# Patient Record
Sex: MALE | Race: WHITE | NOT HISPANIC OR LATINO | URBAN - NONMETROPOLITAN AREA
[De-identification: names, ages, dates, MRNs, and addresses within clinical notes are randomized per-mention and may not be internally consistent; named-entity substitution may affect disease eponyms.]

---

## 2023-09-20 LAB
GRAM STAIN: NORMAL
TISSUE/WOUND CULTURE/SMEAR: NORMAL

## 2024-04-08 ENCOUNTER — TELEMEDICINE (OUTPATIENT)
Dept: PRIMARY CARE | Facility: CLINIC | Age: 69
End: 2024-04-08
Payer: MEDICARE

## 2024-04-08 VITALS — TEMPERATURE: 98.4 F | OXYGEN SATURATION: 95 % | HEART RATE: 112 BPM

## 2024-04-08 DIAGNOSIS — R68.89 FLU-LIKE SYMPTOMS: Primary | ICD-10-CM

## 2024-04-08 DIAGNOSIS — J10.1 INFLUENZA A: ICD-10-CM

## 2024-04-08 LAB
FLUAV RNA RESP QL NAA+PROBE: DETECTED
FLUBV RNA RESP QL NAA+PROBE: NOT DETECTED

## 2024-04-08 PROCEDURE — 1159F MED LIST DOCD IN RCRD: CPT | Performed by: FAMILY MEDICINE

## 2024-04-08 PROCEDURE — 87502 INFLUENZA DNA AMP PROBE: CPT | Performed by: FAMILY MEDICINE

## 2024-04-08 PROCEDURE — 99441 PR PHYS/QHP TELEPHONE EVALUATION 5-10 MIN: CPT | Performed by: FAMILY MEDICINE

## 2024-04-08 ASSESSMENT — PATIENT HEALTH QUESTIONNAIRE - PHQ9
1. LITTLE INTEREST OR PLEASURE IN DOING THINGS: NOT AT ALL
SUM OF ALL RESPONSES TO PHQ9 QUESTIONS 1 AND 2: 0
2. FEELING DOWN, DEPRESSED OR HOPELESS: NOT AT ALL

## 2024-04-09 RX ORDER — OSELTAMIVIR PHOSPHATE 75 MG/1
75 CAPSULE ORAL 2 TIMES DAILY
Qty: 10 CAPSULE | Refills: 0 | Status: SHIPPED | OUTPATIENT
Start: 2024-04-09 | End: 2024-04-13 | Stop reason: HOSPADM

## 2024-04-11 ENCOUNTER — HOSPITAL ENCOUNTER (INPATIENT)
Facility: HOSPITAL | Age: 69
LOS: 1 days | Discharge: HOME | DRG: 195 | End: 2024-04-13
Attending: EMERGENCY MEDICINE | Admitting: INTERNAL MEDICINE
Payer: MEDICARE

## 2024-04-11 ENCOUNTER — APPOINTMENT (OUTPATIENT)
Dept: CARDIOLOGY | Facility: HOSPITAL | Age: 69
DRG: 195 | End: 2024-04-11
Payer: MEDICARE

## 2024-04-11 ENCOUNTER — APPOINTMENT (OUTPATIENT)
Dept: RADIOLOGY | Facility: HOSPITAL | Age: 69
DRG: 195 | End: 2024-04-11
Payer: MEDICARE

## 2024-04-11 DIAGNOSIS — E86.0 DEHYDRATION: ICD-10-CM

## 2024-04-11 DIAGNOSIS — R07.9 RIGHT-SIDED CHEST PAIN: ICD-10-CM

## 2024-04-11 DIAGNOSIS — J18.9 PNEUMONIA OF RIGHT LUNG DUE TO INFECTIOUS ORGANISM, UNSPECIFIED PART OF LUNG: Primary | ICD-10-CM

## 2024-04-11 LAB
ALBUMIN SERPL-MCNC: 3.7 G/DL (ref 3.5–5)
ALP BLD-CCNC: 92 U/L (ref 35–125)
ALT SERPL-CCNC: 31 U/L (ref 5–40)
ANION GAP SERPL CALC-SCNC: 13 MMOL/L
APPEARANCE UR: CLEAR
AST SERPL-CCNC: 23 U/L (ref 5–40)
BASOPHILS # BLD AUTO: 0.05 X10*3/UL (ref 0–0.1)
BASOPHILS NFR BLD AUTO: 0.3 %
BILIRUB SERPL-MCNC: 1 MG/DL (ref 0.1–1.2)
BILIRUB UR STRIP.AUTO-MCNC: NEGATIVE MG/DL
BUN SERPL-MCNC: 10 MG/DL (ref 8–25)
CALCIUM SERPL-MCNC: 9.4 MG/DL (ref 8.5–10.4)
CHLORIDE SERPL-SCNC: 97 MMOL/L (ref 97–107)
CO2 SERPL-SCNC: 22 MMOL/L (ref 24–31)
COLOR UR: YELLOW
CREAT SERPL-MCNC: 0.8 MG/DL (ref 0.4–1.6)
EGFRCR SERPLBLD CKD-EPI 2021: >90 ML/MIN/1.73M*2
EOSINOPHIL # BLD AUTO: 0.01 X10*3/UL (ref 0–0.7)
EOSINOPHIL NFR BLD AUTO: 0.1 %
ERYTHROCYTE [DISTWIDTH] IN BLOOD BY AUTOMATED COUNT: 13.1 % (ref 11.5–14.5)
FLUAV RNA RESP QL NAA+PROBE: NOT DETECTED
FLUBV RNA RESP QL NAA+PROBE: NOT DETECTED
GLUCOSE SERPL-MCNC: 129 MG/DL (ref 65–99)
GLUCOSE UR STRIP.AUTO-MCNC: NORMAL MG/DL
HCT VFR BLD AUTO: 41.7 % (ref 41–52)
HGB BLD-MCNC: 14.7 G/DL (ref 13.5–17.5)
IMM GRANULOCYTES # BLD AUTO: 0.16 X10*3/UL (ref 0–0.7)
IMM GRANULOCYTES NFR BLD AUTO: 0.8 % (ref 0–0.9)
KETONES UR STRIP.AUTO-MCNC: ABNORMAL MG/DL
LACTATE BLDV-SCNC: 1.1 MMOL/L (ref 0.4–2)
LEUKOCYTE ESTERASE UR QL STRIP.AUTO: NEGATIVE
LYMPHOCYTES # BLD AUTO: 1.39 X10*3/UL (ref 1.2–4.8)
LYMPHOCYTES NFR BLD AUTO: 7.1 %
MAGNESIUM SERPL-MCNC: 1.9 MG/DL (ref 1.6–3.1)
MCH RBC QN AUTO: 31.8 PG (ref 26–34)
MCHC RBC AUTO-ENTMCNC: 35.3 G/DL (ref 32–36)
MCV RBC AUTO: 90 FL (ref 80–100)
MONOCYTES # BLD AUTO: 1.94 X10*3/UL (ref 0.1–1)
MONOCYTES NFR BLD AUTO: 10 %
MUCOUS THREADS #/AREA URNS AUTO: NORMAL /LPF
NEUTROPHILS # BLD AUTO: 15.91 X10*3/UL (ref 1.2–7.7)
NEUTROPHILS NFR BLD AUTO: 81.7 %
NITRITE UR QL STRIP.AUTO: NEGATIVE
NRBC BLD-RTO: 0 /100 WBCS (ref 0–0)
PH UR STRIP.AUTO: 5.5 [PH]
PLATELET # BLD AUTO: 239 X10*3/UL (ref 150–450)
POTASSIUM SERPL-SCNC: 3.6 MMOL/L (ref 3.4–5.1)
PROT SERPL-MCNC: 7.9 G/DL (ref 5.9–7.9)
PROT UR STRIP.AUTO-MCNC: ABNORMAL MG/DL
RBC # BLD AUTO: 4.62 X10*6/UL (ref 4.5–5.9)
RBC # UR STRIP.AUTO: NEGATIVE /UL
RBC #/AREA URNS AUTO: NORMAL /HPF
SARS-COV-2 RNA RESP QL NAA+PROBE: NOT DETECTED
SODIUM SERPL-SCNC: 132 MMOL/L (ref 133–145)
SP GR UR STRIP.AUTO: 1.01
TROPONIN T SERPL-MCNC: 10 NG/L
TROPONIN T SERPL-MCNC: 9 NG/L
UROBILINOGEN UR STRIP.AUTO-MCNC: NORMAL MG/DL
WBC # BLD AUTO: 19.5 X10*3/UL (ref 4.4–11.3)
WBC #/AREA URNS AUTO: NORMAL /HPF

## 2024-04-11 PROCEDURE — 99285 EMERGENCY DEPT VISIT HI MDM: CPT | Mod: 25

## 2024-04-11 PROCEDURE — 96365 THER/PROPH/DIAG IV INF INIT: CPT

## 2024-04-11 PROCEDURE — 93010 ELECTROCARDIOGRAM REPORT: CPT | Performed by: INTERNAL MEDICINE

## 2024-04-11 PROCEDURE — 87636 SARSCOV2 & INF A&B AMP PRB: CPT | Performed by: PHYSICIAN ASSISTANT

## 2024-04-11 PROCEDURE — 36415 COLL VENOUS BLD VENIPUNCTURE: CPT | Performed by: PHYSICIAN ASSISTANT

## 2024-04-11 PROCEDURE — 2500000004 HC RX 250 GENERAL PHARMACY W/ HCPCS (ALT 636 FOR OP/ED): Performed by: PHYSICIAN ASSISTANT

## 2024-04-11 PROCEDURE — 85025 COMPLETE CBC W/AUTO DIFF WBC: CPT | Performed by: PHYSICIAN ASSISTANT

## 2024-04-11 PROCEDURE — 71275 CT ANGIOGRAPHY CHEST: CPT | Performed by: STUDENT IN AN ORGANIZED HEALTH CARE EDUCATION/TRAINING PROGRAM

## 2024-04-11 PROCEDURE — 71275 CT ANGIOGRAPHY CHEST: CPT

## 2024-04-11 PROCEDURE — 81001 URINALYSIS AUTO W/SCOPE: CPT | Performed by: PHYSICIAN ASSISTANT

## 2024-04-11 PROCEDURE — 87040 BLOOD CULTURE FOR BACTERIA: CPT | Mod: TRILAB | Performed by: EMERGENCY MEDICINE

## 2024-04-11 PROCEDURE — 84484 ASSAY OF TROPONIN QUANT: CPT | Performed by: PHYSICIAN ASSISTANT

## 2024-04-11 PROCEDURE — 71045 X-RAY EXAM CHEST 1 VIEW: CPT

## 2024-04-11 PROCEDURE — 2500000004 HC RX 250 GENERAL PHARMACY W/ HCPCS (ALT 636 FOR OP/ED): Performed by: EMERGENCY MEDICINE

## 2024-04-11 PROCEDURE — 96361 HYDRATE IV INFUSION ADD-ON: CPT

## 2024-04-11 PROCEDURE — 96375 TX/PRO/DX INJ NEW DRUG ADDON: CPT

## 2024-04-11 PROCEDURE — 93005 ELECTROCARDIOGRAM TRACING: CPT

## 2024-04-11 PROCEDURE — 83735 ASSAY OF MAGNESIUM: CPT | Performed by: PHYSICIAN ASSISTANT

## 2024-04-11 PROCEDURE — 71045 X-RAY EXAM CHEST 1 VIEW: CPT | Performed by: RADIOLOGY

## 2024-04-11 PROCEDURE — 80053 COMPREHEN METABOLIC PANEL: CPT | Performed by: PHYSICIAN ASSISTANT

## 2024-04-11 PROCEDURE — 2550000001 HC RX 255 CONTRASTS: Performed by: EMERGENCY MEDICINE

## 2024-04-11 PROCEDURE — 83605 ASSAY OF LACTIC ACID: CPT | Performed by: EMERGENCY MEDICINE

## 2024-04-11 RX ORDER — ACETAMINOPHEN 325 MG/1
975 TABLET ORAL ONCE
Status: COMPLETED | OUTPATIENT
Start: 2024-04-11 | End: 2024-04-11

## 2024-04-11 RX ORDER — CEFTRIAXONE 2 G/50ML
2 INJECTION, SOLUTION INTRAVENOUS ONCE
Status: COMPLETED | OUTPATIENT
Start: 2024-04-11 | End: 2024-04-11

## 2024-04-11 RX ORDER — ONDANSETRON HYDROCHLORIDE 2 MG/ML
4 INJECTION, SOLUTION INTRAVENOUS ONCE
Status: COMPLETED | OUTPATIENT
Start: 2024-04-11 | End: 2024-04-11

## 2024-04-11 RX ORDER — MORPHINE SULFATE 4 MG/ML
4 INJECTION, SOLUTION INTRAMUSCULAR; INTRAVENOUS ONCE
Status: COMPLETED | OUTPATIENT
Start: 2024-04-11 | End: 2024-04-11

## 2024-04-11 RX ADMIN — CEFTRIAXONE SODIUM 2 G: 2 INJECTION, SOLUTION INTRAVENOUS at 23:11

## 2024-04-11 RX ADMIN — MORPHINE SULFATE 4 MG: 4 INJECTION, SOLUTION INTRAMUSCULAR; INTRAVENOUS at 23:33

## 2024-04-11 RX ADMIN — IOHEXOL 75 ML: 350 INJECTION, SOLUTION INTRAVENOUS at 22:39

## 2024-04-11 RX ADMIN — SODIUM CHLORIDE 1000 ML: 900 INJECTION, SOLUTION INTRAVENOUS at 22:37

## 2024-04-11 RX ADMIN — ACETAMINOPHEN 975 MG: 325 TABLET ORAL at 22:35

## 2024-04-11 RX ADMIN — ONDANSETRON HYDROCHLORIDE 4 MG: 2 INJECTION INTRAMUSCULAR; INTRAVENOUS at 22:25

## 2024-04-11 RX ADMIN — SODIUM CHLORIDE 1000 ML: 900 INJECTION, SOLUTION INTRAVENOUS at 19:56

## 2024-04-11 ASSESSMENT — PAIN DESCRIPTION - FREQUENCY: FREQUENCY: CONSTANT/CONTINUOUS

## 2024-04-11 ASSESSMENT — PAIN - FUNCTIONAL ASSESSMENT
PAIN_FUNCTIONAL_ASSESSMENT: 0-10
PAIN_FUNCTIONAL_ASSESSMENT: 0-10

## 2024-04-11 ASSESSMENT — PAIN SCALES - GENERAL
PAINLEVEL_OUTOF10: 10 - WORST POSSIBLE PAIN

## 2024-04-11 ASSESSMENT — PAIN DESCRIPTION - LOCATION
LOCATION: CHEST
LOCATION: ABDOMEN

## 2024-04-11 ASSESSMENT — PAIN DESCRIPTION - PAIN TYPE: TYPE: ACUTE PAIN

## 2024-04-11 ASSESSMENT — COLUMBIA-SUICIDE SEVERITY RATING SCALE - C-SSRS
2. HAVE YOU ACTUALLY HAD ANY THOUGHTS OF KILLING YOURSELF?: NO
1. IN THE PAST MONTH, HAVE YOU WISHED YOU WERE DEAD OR WISHED YOU COULD GO TO SLEEP AND NOT WAKE UP?: NO
6. HAVE YOU EVER DONE ANYTHING, STARTED TO DO ANYTHING, OR PREPARED TO DO ANYTHING TO END YOUR LIFE?: NO

## 2024-04-11 ASSESSMENT — PAIN DESCRIPTION - PROGRESSION: CLINICAL_PROGRESSION: NOT CHANGED

## 2024-04-11 ASSESSMENT — PAIN DESCRIPTION - DESCRIPTORS: DESCRIPTORS: PRESSURE

## 2024-04-11 ASSESSMENT — PAIN DESCRIPTION - ONSET: ONSET: AWAKENED FROM SLEEP

## 2024-04-11 NOTE — ED TRIAGE NOTES
Triage Note:  Date of Encounter: [unfilled]   MRN: [id number]    I saw the patient as the clinician in triage and performed a brief history and physical exam established acuity and order appropriate test to develop basic plan of care.  Patient will be seen by LUANN and/or the physician who will independently evaluate  The patient.  Please see subsequent provider notes for further details and disposition      Brief HPI:   In brief, 68-year-old male here for syncopal event, recently diagnosed with influenza, not feeling well    Focused physical exam:  Speaking clearly no signs of labored breathing respiratory distress    Plan/MDM:  Laboratory studies, EKG, IV fluids        Please see subsequent provider note for details and disposition

## 2024-04-12 ENCOUNTER — APPOINTMENT (OUTPATIENT)
Dept: CARDIOLOGY | Facility: HOSPITAL | Age: 69
DRG: 195 | End: 2024-04-12
Payer: MEDICARE

## 2024-04-12 PROBLEM — J18.9 PNEUMONIA OF RIGHT LUNG DUE TO INFECTIOUS ORGANISM, UNSPECIFIED PART OF LUNG: Status: ACTIVE | Noted: 2024-04-12

## 2024-04-12 LAB
ALBUMIN SERPL-MCNC: 2.6 G/DL (ref 3.5–5)
ALP BLD-CCNC: 85 U/L (ref 35–125)
ALT SERPL-CCNC: 22 U/L (ref 5–40)
AMPHETAMINES UR QL SCN>1000 NG/ML: NEGATIVE
ANION GAP SERPL CALC-SCNC: 10 MMOL/L
AST SERPL-CCNC: 17 U/L (ref 5–40)
BARBITURATES UR QL SCN>300 NG/ML: NEGATIVE
BENZODIAZ UR QL SCN>300 NG/ML: NEGATIVE
BILIRUB SERPL-MCNC: 0.6 MG/DL (ref 0.1–1.2)
BUN SERPL-MCNC: 9 MG/DL (ref 8–25)
BZE UR QL SCN>300 NG/ML: NEGATIVE
C DIF TOX TCDA+TCDB STL QL NAA+PROBE: NOT DETECTED
CALCIUM SERPL-MCNC: 8.1 MG/DL (ref 8.5–10.4)
CANNABINOIDS UR QL SCN>50 NG/ML: POSITIVE
CHLORIDE SERPL-SCNC: 102 MMOL/L (ref 97–107)
CO2 SERPL-SCNC: 23 MMOL/L (ref 24–31)
CREAT SERPL-MCNC: 0.8 MG/DL (ref 0.4–1.6)
EGFRCR SERPLBLD CKD-EPI 2021: >90 ML/MIN/1.73M*2
ERYTHROCYTE [DISTWIDTH] IN BLOOD BY AUTOMATED COUNT: 13.2 % (ref 11.5–14.5)
ETHANOL SERPL-MCNC: <0.01 G/DL
FENTANYL+NORFENTANYL UR QL SCN: NEGATIVE
GLUCOSE SERPL-MCNC: 97 MG/DL (ref 65–99)
HCT VFR BLD AUTO: 35.7 % (ref 41–52)
HGB BLD-MCNC: 12.3 G/DL (ref 13.5–17.5)
INR PPP: 1.3 (ref 0.9–1.2)
LEGIONELLA AG UR QL: NEGATIVE
MAGNESIUM SERPL-MCNC: 1.9 MG/DL (ref 1.6–3.1)
MAGNESIUM SERPL-MCNC: 2 MG/DL (ref 1.6–3.1)
MCH RBC QN AUTO: 32 PG (ref 26–34)
MCHC RBC AUTO-ENTMCNC: 34.5 G/DL (ref 32–36)
MCV RBC AUTO: 93 FL (ref 80–100)
METHADONE UR QL SCN>300 NG/ML: NEGATIVE
NRBC BLD-RTO: 0 /100 WBCS (ref 0–0)
NT-PROBNP SERPL-MCNC: 236 PG/ML (ref 0–229)
OPIATES UR QL SCN>300 NG/ML: POSITIVE
OXYCODONE UR QL: NEGATIVE
PCP UR QL SCN>25 NG/ML: NEGATIVE
PLATELET # BLD AUTO: 204 X10*3/UL (ref 150–450)
POTASSIUM SERPL-SCNC: 3.4 MMOL/L (ref 3.4–5.1)
PROT SERPL-MCNC: 6 G/DL (ref 5.9–7.9)
PROTHROMBIN TIME: 13.4 SECONDS (ref 9.3–12.7)
RBC # BLD AUTO: 3.84 X10*6/UL (ref 4.5–5.9)
S PNEUM AG UR QL: NEGATIVE
SODIUM SERPL-SCNC: 135 MMOL/L (ref 133–145)
TROPONIN T SERPL-MCNC: 11 NG/L
TROPONIN T SERPL-MCNC: 11 NG/L
TROPONIN T SERPL-MCNC: 12 NG/L
VANCOMYCIN SERPL-MCNC: 6.8 UG/ML (ref 10–20)
WBC # BLD AUTO: 12.1 X10*3/UL (ref 4.4–11.3)

## 2024-04-12 PROCEDURE — 85027 COMPLETE CBC AUTOMATED: CPT | Performed by: INTERNAL MEDICINE

## 2024-04-12 PROCEDURE — 2500000004 HC RX 250 GENERAL PHARMACY W/ HCPCS (ALT 636 FOR OP/ED): Mod: JZ

## 2024-04-12 PROCEDURE — 2500000004 HC RX 250 GENERAL PHARMACY W/ HCPCS (ALT 636 FOR OP/ED): Performed by: EMERGENCY MEDICINE

## 2024-04-12 PROCEDURE — 2500000004 HC RX 250 GENERAL PHARMACY W/ HCPCS (ALT 636 FOR OP/ED): Performed by: INTERNAL MEDICINE

## 2024-04-12 PROCEDURE — 80053 COMPREHEN METABOLIC PANEL: CPT | Performed by: INTERNAL MEDICINE

## 2024-04-12 PROCEDURE — 87449 NOS EACH ORGANISM AG IA: CPT | Mod: TRILAB,WESLAB | Performed by: INTERNAL MEDICINE

## 2024-04-12 PROCEDURE — 83735 ASSAY OF MAGNESIUM: CPT | Performed by: INTERNAL MEDICINE

## 2024-04-12 PROCEDURE — 84484 ASSAY OF TROPONIN QUANT: CPT | Performed by: INTERNAL MEDICINE

## 2024-04-12 PROCEDURE — 2500000001 HC RX 250 WO HCPCS SELF ADMINISTERED DRUGS (ALT 637 FOR MEDICARE OP): Performed by: NURSE PRACTITIONER

## 2024-04-12 PROCEDURE — 85610 PROTHROMBIN TIME: CPT | Performed by: INTERNAL MEDICINE

## 2024-04-12 PROCEDURE — 94664 DEMO&/EVAL PT USE INHALER: CPT

## 2024-04-12 PROCEDURE — G0378 HOSPITAL OBSERVATION PER HR: HCPCS

## 2024-04-12 PROCEDURE — 36415 COLL VENOUS BLD VENIPUNCTURE: CPT | Performed by: INTERNAL MEDICINE

## 2024-04-12 PROCEDURE — 2500000002 HC RX 250 W HCPCS SELF ADMINISTERED DRUGS (ALT 637 FOR MEDICARE OP, ALT 636 FOR OP/ED): Performed by: INTERNAL MEDICINE

## 2024-04-12 PROCEDURE — 82077 ASSAY SPEC XCP UR&BREATH IA: CPT | Performed by: INTERNAL MEDICINE

## 2024-04-12 PROCEDURE — 93005 ELECTROCARDIOGRAM TRACING: CPT

## 2024-04-12 PROCEDURE — 99221 1ST HOSP IP/OBS SF/LOW 40: CPT | Performed by: INTERNAL MEDICINE

## 2024-04-12 PROCEDURE — 80202 ASSAY OF VANCOMYCIN: CPT

## 2024-04-12 PROCEDURE — 80349 CANNABINOIDS NATURAL: CPT | Performed by: INTERNAL MEDICINE

## 2024-04-12 PROCEDURE — 87899 AGENT NOS ASSAY W/OPTIC: CPT | Mod: TRILAB,WESLAB | Performed by: INTERNAL MEDICINE

## 2024-04-12 PROCEDURE — 2500000001 HC RX 250 WO HCPCS SELF ADMINISTERED DRUGS (ALT 637 FOR MEDICARE OP): Performed by: INTERNAL MEDICINE

## 2024-04-12 PROCEDURE — 87493 C DIFF AMPLIFIED PROBE: CPT | Performed by: INTERNAL MEDICINE

## 2024-04-12 PROCEDURE — 80365 DRUG SCREENING OXYCODONE: CPT | Mod: TRILAB,WESLAB | Performed by: INTERNAL MEDICINE

## 2024-04-12 PROCEDURE — 83880 ASSAY OF NATRIURETIC PEPTIDE: CPT | Performed by: INTERNAL MEDICINE

## 2024-04-12 PROCEDURE — 87506 IADNA-DNA/RNA PROBE TQ 6-11: CPT | Mod: TRILAB,WESLAB | Performed by: INTERNAL MEDICINE

## 2024-04-12 PROCEDURE — 80307 DRUG TEST PRSMV CHEM ANLYZR: CPT | Performed by: INTERNAL MEDICINE

## 2024-04-12 PROCEDURE — 1200000002 HC GENERAL ROOM WITH TELEMETRY DAILY

## 2024-04-12 RX ORDER — ENOXAPARIN SODIUM 100 MG/ML
40 INJECTION SUBCUTANEOUS NIGHTLY
Status: DISCONTINUED | OUTPATIENT
Start: 2024-04-12 | End: 2024-04-13 | Stop reason: HOSPADM

## 2024-04-12 RX ORDER — ACETAMINOPHEN 325 MG/1
650 TABLET ORAL EVERY 4 HOURS PRN
Status: DISCONTINUED | OUTPATIENT
Start: 2024-04-12 | End: 2024-04-13 | Stop reason: HOSPADM

## 2024-04-12 RX ORDER — ACETAMINOPHEN 160 MG/5ML
650 SOLUTION ORAL EVERY 4 HOURS PRN
Status: DISCONTINUED | OUTPATIENT
Start: 2024-04-12 | End: 2024-04-13 | Stop reason: HOSPADM

## 2024-04-12 RX ORDER — PANTOPRAZOLE SODIUM 40 MG/10ML
40 INJECTION, POWDER, LYOPHILIZED, FOR SOLUTION INTRAVENOUS
Status: DISCONTINUED | OUTPATIENT
Start: 2024-04-12 | End: 2024-04-13 | Stop reason: HOSPADM

## 2024-04-12 RX ORDER — PANTOPRAZOLE SODIUM 40 MG/1
40 TABLET, DELAYED RELEASE ORAL
Status: DISCONTINUED | OUTPATIENT
Start: 2024-04-12 | End: 2024-04-13 | Stop reason: HOSPADM

## 2024-04-12 RX ORDER — FOLIC ACID 1 MG/1
1 TABLET ORAL DAILY
Status: DISCONTINUED | OUTPATIENT
Start: 2024-04-12 | End: 2024-04-13 | Stop reason: HOSPADM

## 2024-04-12 RX ORDER — OSELTAMIVIR PHOSPHATE 75 MG/1
75 CAPSULE ORAL NIGHTLY
Qty: 2 CAPSULE | Refills: 0 | Status: DISCONTINUED | OUTPATIENT
Start: 2024-04-12 | End: 2024-04-12

## 2024-04-12 RX ORDER — MULTIVIT-MIN/IRON FUM/FOLIC AC 7.5 MG-4
1 TABLET ORAL DAILY
Status: DISCONTINUED | OUTPATIENT
Start: 2024-04-12 | End: 2024-04-13 | Stop reason: HOSPADM

## 2024-04-12 RX ORDER — VANCOMYCIN HYDROCHLORIDE 1 G/20ML
INJECTION, POWDER, LYOPHILIZED, FOR SOLUTION INTRAVENOUS DAILY PRN
Status: DISCONTINUED | OUTPATIENT
Start: 2024-04-12 | End: 2024-04-12

## 2024-04-12 RX ORDER — POLYETHYLENE GLYCOL 3350 17 G/17G
17 POWDER, FOR SOLUTION ORAL DAILY
Status: DISCONTINUED | OUTPATIENT
Start: 2024-04-12 | End: 2024-04-13 | Stop reason: HOSPADM

## 2024-04-12 RX ORDER — IPRATROPIUM BROMIDE AND ALBUTEROL SULFATE 2.5; .5 MG/3ML; MG/3ML
3 SOLUTION RESPIRATORY (INHALATION) EVERY 2 HOUR PRN
Status: DISCONTINUED | OUTPATIENT
Start: 2024-04-12 | End: 2024-04-13 | Stop reason: HOSPADM

## 2024-04-12 RX ORDER — DIPHENHYDRAMINE HCL 25 MG
25 TABLET ORAL EVERY 6 HOURS PRN
Status: DISCONTINUED | OUTPATIENT
Start: 2024-04-12 | End: 2024-04-13 | Stop reason: HOSPADM

## 2024-04-12 RX ORDER — VANCOMYCIN 1 G/200ML
1000 INJECTION, SOLUTION INTRAVENOUS EVERY 12 HOURS
Status: DISCONTINUED | OUTPATIENT
Start: 2024-04-12 | End: 2024-04-12

## 2024-04-12 RX ORDER — ACETAMINOPHEN 650 MG/1
650 SUPPOSITORY RECTAL EVERY 4 HOURS PRN
Status: DISCONTINUED | OUTPATIENT
Start: 2024-04-12 | End: 2024-04-13 | Stop reason: HOSPADM

## 2024-04-12 RX ORDER — SODIUM CHLORIDE 9 MG/ML
100 INJECTION, SOLUTION INTRAVENOUS CONTINUOUS
Status: DISCONTINUED | OUTPATIENT
Start: 2024-04-12 | End: 2024-04-13 | Stop reason: HOSPADM

## 2024-04-12 RX ORDER — OXYCODONE AND ACETAMINOPHEN 5; 325 MG/1; MG/1
1 TABLET ORAL EVERY 6 HOURS PRN
Status: DISCONTINUED | OUTPATIENT
Start: 2024-04-12 | End: 2024-04-12

## 2024-04-12 RX ORDER — LANOLIN ALCOHOL/MO/W.PET/CERES
100 CREAM (GRAM) TOPICAL DAILY
Status: DISCONTINUED | OUTPATIENT
Start: 2024-04-15 | End: 2024-04-13 | Stop reason: HOSPADM

## 2024-04-12 RX ORDER — MORPHINE SULFATE 2 MG/ML
2 INJECTION, SOLUTION INTRAMUSCULAR; INTRAVENOUS EVERY 4 HOURS PRN
Status: DISCONTINUED | OUTPATIENT
Start: 2024-04-12 | End: 2024-04-12

## 2024-04-12 RX ADMIN — OSELTAMIVIR PHOSPHATE 75 MG: 75 CAPSULE ORAL at 02:01

## 2024-04-12 RX ADMIN — PANTOPRAZOLE SODIUM 40 MG: 40 TABLET, DELAYED RELEASE ORAL at 07:10

## 2024-04-12 RX ADMIN — ACETAMINOPHEN 650 MG: 325 TABLET ORAL at 23:38

## 2024-04-12 RX ADMIN — Medication 1 TABLET: at 08:19

## 2024-04-12 RX ADMIN — THIAMINE HYDROCHLORIDE 500 MG: 100 INJECTION, SOLUTION INTRAMUSCULAR; INTRAVENOUS at 15:46

## 2024-04-12 RX ADMIN — PIPERACILLIN SODIUM AND TAZOBACTAM SODIUM 4.5 G: 4; .5 INJECTION, SOLUTION INTRAVENOUS at 02:56

## 2024-04-12 RX ADMIN — FOLIC ACID 1 MG: 1 TABLET ORAL at 08:19

## 2024-04-12 RX ADMIN — ENOXAPARIN SODIUM 40 MG: 40 INJECTION SUBCUTANEOUS at 20:16

## 2024-04-12 RX ADMIN — MORPHINE SULFATE 2 MG: 2 INJECTION, SOLUTION INTRAMUSCULAR; INTRAVENOUS at 04:08

## 2024-04-12 RX ADMIN — MORPHINE SULFATE 2 MG: 2 INJECTION, SOLUTION INTRAMUSCULAR; INTRAVENOUS at 07:58

## 2024-04-12 RX ADMIN — SODIUM CHLORIDE 100 ML/HR: 900 INJECTION, SOLUTION INTRAVENOUS at 20:01

## 2024-04-12 RX ADMIN — ENOXAPARIN SODIUM 40 MG: 40 INJECTION SUBCUTANEOUS at 02:11

## 2024-04-12 RX ADMIN — SODIUM CHLORIDE 100 ML/HR: 900 INJECTION, SOLUTION INTRAVENOUS at 01:20

## 2024-04-12 RX ADMIN — VANCOMYCIN 1000 MG: 1 INJECTION, SOLUTION INTRAVENOUS at 04:04

## 2024-04-12 RX ADMIN — ACETAMINOPHEN 650 MG: 325 TABLET ORAL at 02:00

## 2024-04-12 RX ADMIN — THIAMINE HYDROCHLORIDE 500 MG: 100 INJECTION, SOLUTION INTRAMUSCULAR; INTRAVENOUS at 23:22

## 2024-04-12 RX ADMIN — PIPERACILLIN SODIUM AND TAZOBACTAM SODIUM 4.5 G: 4; .5 INJECTION, SOLUTION INTRAVENOUS at 14:23

## 2024-04-12 RX ADMIN — ACETAMINOPHEN 650 MG: 325 TABLET ORAL at 20:15

## 2024-04-12 RX ADMIN — PIPERACILLIN SODIUM AND TAZOBACTAM SODIUM 4.5 G: 4; .5 INJECTION, SOLUTION INTRAVENOUS at 07:08

## 2024-04-12 RX ADMIN — AZITHROMYCIN MONOHYDRATE 500 MG: 500 INJECTION, POWDER, LYOPHILIZED, FOR SOLUTION INTRAVENOUS at 00:25

## 2024-04-12 RX ADMIN — THIAMINE HYDROCHLORIDE 500 MG: 100 INJECTION, SOLUTION INTRAMUSCULAR; INTRAVENOUS at 02:01

## 2024-04-12 RX ADMIN — DIPHENHYDRAMINE HYDROCHLORIDE 25 MG: 25 TABLET ORAL at 11:03

## 2024-04-12 RX ADMIN — PIPERACILLIN SODIUM AND TAZOBACTAM SODIUM 4.5 G: 4; .5 INJECTION, SOLUTION INTRAVENOUS at 20:01

## 2024-04-12 RX ADMIN — THIAMINE HYDROCHLORIDE 500 MG: 100 INJECTION, SOLUTION INTRAMUSCULAR; INTRAVENOUS at 08:19

## 2024-04-12 SDOH — SOCIAL STABILITY: SOCIAL INSECURITY: HAS ANYONE EVER THREATENED TO HURT YOUR FAMILY OR YOUR PETS?: NO

## 2024-04-12 SDOH — SOCIAL STABILITY: SOCIAL INSECURITY: ARE YOU OR HAVE YOU BEEN THREATENED OR ABUSED PHYSICALLY, EMOTIONALLY, OR SEXUALLY BY ANYONE?: NO

## 2024-04-12 SDOH — SOCIAL STABILITY: SOCIAL INSECURITY: DO YOU FEEL UNSAFE GOING BACK TO THE PLACE WHERE YOU ARE LIVING?: NO

## 2024-04-12 SDOH — SOCIAL STABILITY: SOCIAL INSECURITY: HAVE YOU HAD THOUGHTS OF HARMING ANYONE ELSE?: NO

## 2024-04-12 SDOH — SOCIAL STABILITY: SOCIAL INSECURITY: ABUSE: ADULT

## 2024-04-12 SDOH — SOCIAL STABILITY: SOCIAL INSECURITY: DO YOU FEEL ANYONE HAS EXPLOITED OR TAKEN ADVANTAGE OF YOU FINANCIALLY OR OF YOUR PERSONAL PROPERTY?: NO

## 2024-04-12 SDOH — SOCIAL STABILITY: SOCIAL INSECURITY: WERE YOU ABLE TO COMPLETE ALL THE BEHAVIORAL HEALTH SCREENINGS?: YES

## 2024-04-12 SDOH — SOCIAL STABILITY: SOCIAL INSECURITY: ARE THERE ANY APPARENT SIGNS OF INJURIES/BEHAVIORS THAT COULD BE RELATED TO ABUSE/NEGLECT?: NO

## 2024-04-12 SDOH — SOCIAL STABILITY: SOCIAL INSECURITY: DOES ANYONE TRY TO KEEP YOU FROM HAVING/CONTACTING OTHER FRIENDS OR DOING THINGS OUTSIDE YOUR HOME?: NO

## 2024-04-12 ASSESSMENT — COGNITIVE AND FUNCTIONAL STATUS - GENERAL
MOBILITY SCORE: 24
DAILY ACTIVITIY SCORE: 24
PATIENT BASELINE BEDBOUND: NO
DAILY ACTIVITIY SCORE: 24
DAILY ACTIVITIY SCORE: 24

## 2024-04-12 ASSESSMENT — ACTIVITIES OF DAILY LIVING (ADL)
WALKS IN HOME: INDEPENDENT
ADEQUATE_TO_COMPLETE_ADL: YES
HEARING - LEFT EAR: FUNCTIONAL
BATHING: INDEPENDENT
DRESSING YOURSELF: INDEPENDENT
PATIENT'S MEMORY ADEQUATE TO SAFELY COMPLETE DAILY ACTIVITIES?: YES
TOILETING: INDEPENDENT
JUDGMENT_ADEQUATE_SAFELY_COMPLETE_DAILY_ACTIVITIES: YES
GROOMING: INDEPENDENT
FEEDING YOURSELF: INDEPENDENT
LACK_OF_TRANSPORTATION: NO
HEARING - RIGHT EAR: FUNCTIONAL

## 2024-04-12 ASSESSMENT — PAIN SCALES - GENERAL
PAINLEVEL_OUTOF10: 9
PAINLEVEL_OUTOF10: 0 - NO PAIN
PAINLEVEL_OUTOF10: 6
PAINLEVEL_OUTOF10: 4
PAINLEVEL_OUTOF10: 6
PAINLEVEL_OUTOF10: 10 - WORST POSSIBLE PAIN
PAINLEVEL_OUTOF10: 7
PAINLEVEL_OUTOF10: 4
PAINLEVEL_OUTOF10: 10 - WORST POSSIBLE PAIN
PAINLEVEL_OUTOF10: 4
PAINLEVEL_OUTOF10: 10 - WORST POSSIBLE PAIN
PAINLEVEL_OUTOF10: 2
PAINLEVEL_OUTOF10: 0 - NO PAIN

## 2024-04-12 ASSESSMENT — LIFESTYLE VARIABLES
AUDIT-C TOTAL SCORE: 4
HOW OFTEN DURING THE LAST YEAR HAVE YOU NEEDED AN ALCOHOLIC DRINK FIRST THING IN THE MORNING TO GET YOURSELF GOING AFTER A NIGHT OF HEAVY DRINKING: NEVER
HOW OFTEN DURING THE LAST YEAR HAVE YOU FOUND THAT YOU WERE NOT ABLE TO STOP DRINKING ONCE YOU HAD STARTED: NEVER
HOW OFTEN DURING THE LAST YEAR HAVE YOU HAD A FEELING OF GUILT OR REMORSE AFTER DRINKING: NEVER
HOW OFTEN DURING THE LAST YEAR HAVE YOU BEEN UNABLE TO REMEMBER WHAT HAPPENED THE NIGHT BEFORE BECAUSE YOU HAD BEEN DRINKING: NEVER
AUDIT TOTAL SCORE: 0
AUDIT-C TOTAL SCORE: 4
SKIP TO QUESTIONS 9-10: 1
AUDIT TOTAL SCORE: 4
HOW OFTEN DO YOU HAVE A DRINK CONTAINING ALCOHOL: 4 OR MORE TIMES A WEEK
HOW MANY STANDARD DRINKS CONTAINING ALCOHOL DO YOU HAVE ON A TYPICAL DAY: 1 OR 2
HOW OFTEN DO YOU HAVE 6 OR MORE DRINKS ON ONE OCCASION: NEVER
HAS A RELATIVE, FRIEND, DOCTOR, OR ANOTHER HEALTH PROFESSIONAL EXPRESSED CONCERN ABOUT YOUR DRINKING OR SUGGESTED YOU CUT DOWN: NO
HAVE YOU OR SOMEONE ELSE BEEN INJURED AS A RESULT OF YOUR DRINKING: NO
HOW OFTEN DURING THE LAST YEAR HAVE YOU FAILED TO DO WHAT WAS NORMALLY EXPECTED FROM YOU BECAUSE OF DRINKING: NEVER

## 2024-04-12 ASSESSMENT — ENCOUNTER SYMPTOMS
GASTROINTESTINAL NEGATIVE: 1
EYES NEGATIVE: 1
DIARRHEA: 1
ENDOCRINE NEGATIVE: 1
NEUROLOGICAL NEGATIVE: 1
CARDIOVASCULAR NEGATIVE: 1
ALLERGIC/IMMUNOLOGIC NEGATIVE: 1
HEMATOLOGIC/LYMPHATIC NEGATIVE: 1
CONSTITUTIONAL NEGATIVE: 1
PSYCHIATRIC NEGATIVE: 1
CHEST TIGHTNESS: 1
COUGH: 1
MUSCULOSKELETAL NEGATIVE: 1
CHILLS: 1

## 2024-04-12 ASSESSMENT — PAIN - FUNCTIONAL ASSESSMENT
PAIN_FUNCTIONAL_ASSESSMENT: 0-10
PAIN_FUNCTIONAL_ASSESSMENT: UNABLE TO SELF-REPORT
PAIN_FUNCTIONAL_ASSESSMENT: 0-10

## 2024-04-12 ASSESSMENT — PAIN DESCRIPTION - LOCATION
LOCATION: HEAD
LOCATION: HEAD
LOCATION: CHEST
LOCATION: CHEST
LOCATION: HEAD

## 2024-04-12 ASSESSMENT — PATIENT HEALTH QUESTIONNAIRE - PHQ9
2. FEELING DOWN, DEPRESSED OR HOPELESS: NOT AT ALL
SUM OF ALL RESPONSES TO PHQ9 QUESTIONS 1 & 2: 0
1. LITTLE INTEREST OR PLEASURE IN DOING THINGS: NOT AT ALL

## 2024-04-12 NOTE — CONSULTS
Consults  History Of Present Illness:    Arian Iglesias is a 68 y.o. male patient with history of COVID-negative but influenza A positive, currently in isolation.  Consulted for underlying low blood pressure for syncopal episode.  Patient poor historian history from the chart.  Patient recently had upper respiratory infection and started on a Tamiflu.  Continues to have fever chills and possible dehydration.  Also episode of diarrhea.  Patient went to ER and the wife reported patient had a syncopal episode 8 days ago with a fever and diarrhea.  The pain in the right side is sharp pain.  No active angina or CHF signs symptoms.  Pending orthostatic blood pressure.  Last Recorded Vitals:  Vitals:    04/12/24 0810 04/12/24 1154 04/12/24 1157 04/12/24 1159   BP: 130/69 101/69 126/76 142/72   BP Location: Left arm Right arm Right arm Right arm   Patient Position: Sitting Lying Sitting Standing   Pulse: 74 67     Resp: 17 17     Temp: 36.5 °C (97.7 °F) 36.9 °C (98.4 °F)     TempSrc: Temporal Temporal     SpO2: 93% 93%     Weight:       Height:           Past Medical History:  He has no past medical history on file.    Past Surgical History:  He has no past surgical history on file.      Social History:  He reports that he has quit smoking. His smoking use included cigarettes. He has been exposed to tobacco smoke. He has never used smokeless tobacco. No history on file for alcohol use and drug use.    Family History:  No family history on file.     Allergies:  Patient has no known allergies.    Inpatient Medications:  Scheduled medications   Medication Dose Route Frequency    enoxaparin  40 mg subcutaneous Nightly    folic acid  1 mg oral Daily    multivitamin with minerals  1 tablet oral Daily    pantoprazole  40 mg oral Daily before breakfast    Or    pantoprazole  40 mg intravenous Daily before breakfast    piperacillin-tazobactam  4.5 g intravenous q6h    polyethylene glycol  17 g oral Daily    [START ON 4/15/2024]  thiamine  100 mg oral Daily    thiamine  500 mg intravenous q8h [facility]     Outpatient Medications:  Current Outpatient Medications   Medication Instructions    oseltamivir (TAMIFLU) 75 mg, oral, 2 times daily       Physical Exam:  Patient was seen via window to conserve PPE.  Discussed with the nurse about orthostatic blood pressure.  Continue current subcu Lovenox, antibiotic.  Last Labs:  CBC - 4/12/2024:  5:02 AM  12.1 12.3 204    35.7      CMP - 4/12/2024:  5:02 AM  8.1 6.0 17 --- 0.6   _ 2.6 22 85      PTT - No results in last year.  1.3   13.4 _     LDL Calculated   Date/Time Value Ref Range Status   06/20/2020 10:13  65 - 130 MG/DL Final          CT angio chest for pulmonary embolism  Narrative: Interpreted By:  Linwood Lopez,   STUDY:  CT ANGIO CHEST FOR PULMONARY EMBOLISM;  4/11/2024 10:45 pm      INDICATION:  Signs/Symptoms:R CP/infiltrate/syncope.      COMPARISON:  Radiographs of the chest dated 04/11/2024.      ACCESSION NUMBER(S):  [id number]      ORDERING CLINICIAN:  LAWRENCE NYE      TECHNIQUE:  Helical data acquisition of the chest was obtained after intravenous  administration of 75 mLof Omnipaque 350, as per PE protocol. Images  were reformatted in coronal and sagittal planes. Axial and coronal  maximum intensity projection (MIP) images were created and reviewed.      FINDINGS:  POTENTIAL LIMITATIONS OF THE STUDY: None      HEART AND VESSELS:  There are no discrete filling defects within main pulmonary artery  and its branches to suggest acute pulmonary embolism. Main pulmonary  artery and its branches are normal in caliber.      Ascending thoracic aorta is mildly ectatic, measuring up to 3.7 cm in  diameter, and demonstrates mild vascular calcifications. Although,  the study is not tailored for evaluation of aorta, there is no  definite evidence of acute aortic pathology. Minimal coronary artery  calcifications are seen. Please note, the study is not optimized for  evaluation of  coronary arteries.      The cardiac chambers are not enlarged.There are no findings to  suggest right heart strain. There is no pericardial effusion seen.      MEDIASTINUM AND MARKUS, LOWER NECK AND AXILLA:  The visualized thyroid gland is within normal limits.  Several mildly enlarged right hilar and mediastinal lymph nodes are  present, measuring up to 1.4 cm in diameter. Esophagus appears within  normal limits as seen.      LUNGS AND AIRWAYS:  The trachea and central airways are patent. No endobronchial lesion  is seen.      Geographic areas of consolidation with some volume loss and air  bronchograms are present in the right upper lobe. Several additional  subtle nodular opacities are present in the right middle lobe.      Trace right-sided pleural effusion is present.      No consolidation or pleural effusion is identified in the left lung.      UPPER ABDOMEN:  The visualized subdiaphragmatic structures demonstrate no remarkable  findings.      CHEST WALL AND OSSEOUS STRUCTURES:  Chest wall is within normal limits.  No acute osseous pathology.There are no suspicious osseous  lesions.Mild multilevel degenerative changes are present in the  thoracic spine.      Impression: 1. No evidence of pulmonary embolism.  2. Geographic consolidative opacities with mild volume loss and air  bronchograms are present in the right upper lobe, with several subtle  nodular opacities also present in the right lower lobe. Findings are  suggestive of pneumonia, although underlying mass is not entirely  excluded. Follow-up imaging after appropriate clinical intervention  is recommended.  3. Mildly enlarged, nonspecific, right hilar and mediastinal lymph  nodes may be reactive, although continued attention to on follow-up  is recommended.  4. 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          MACRO:  None      Signed by: Linwood Lopez 4/11/2024 11:13 PM  Dictation workstation:    [id number]  XR chest 1 view  Narrative: Interpreted By:  Stacy Lazar,   STUDY:  XR CHEST 1 VIEW;  4/11/2024 7:55 pm      INDICATION:  Signs/Symptoms:syncope.      COMPARISON:  07/13/2021      ACCESSION NUMBER(S):  [id number]      ORDERING CLINICIAN:  MONIK SEN      FINDINGS:  Hazy consolidation right mid lung zone suspicious for pneumonia in  the appropriate clinical setting. Streaky bibasilar atelectasis. No  pleural effusion or pneumothorax. Normal heart size. No acute osseous  abnormality. ACDF hardware.      Impression: Hazy consolidation right mid lung zone suspicious for pneumonia in  the appropriate clinical setting.      Signed by: Stacy Lazar 4/11/2024 8:27 PM  Dictation workstation:   [id number]      Principal Problem:    Pneumonia of right lung due to infectious organism, unspecified part of lung    Assessment/Plan   68-year male patient with a underlying acute respiratory failure now with COVID-negative patient currently on isolation.  Patient now with a influenza type A.  More likely postviral bacterial pneumonia with diarrhea and dizziness with a recently syncopal episode.  1.  Syncope: More likely due to underlying dehydration, diarrhea, sepsis.  Continue to check orthostatic blood pressure IV fluids.  Continue Tamiflu.  Supportive therapy the moment.  Continue IV antibiotics as well as subcu Lovenox.  BMP is unremarkable with a hemoglobin 12.3 platelet count is 204.  Critical care time is spent at bedside includes review of diagnostic tests, labs, and radiographs, serial assessments and management of hemodynamics, EKGs, old echoes, cardiac work-up and coordination of care.  Assessment, impression and plans are reflected in the note above as well as the orders.  No further cardiac input.  Unless patient becomes symptomatic.  If needed add midodrine.  Will sign off  Code Status:  Full Code  I spent 45 minutes in the professional and overall care of this patient.  Jimmy MCDANIELS  MD Adolfo

## 2024-04-12 NOTE — ED PROVIDER NOTES
HPI   Chief Complaint   Patient presents with    Flu Symptoms     Pt presents to ed with chief complaint of flu A. Pt tested positive at Drs office. Pt states he had syncopal episode x8 days ago. Pt did have head strike. Pupils pearrl. No thinners. Aox4. Stable.       HPI       68-year-old male with right-sided chest pain, shortness of breath and fever.  Patient's wife reports he had a syncopal episode 8 days ago.  Started having fever vomiting on Monday.  Was taken to his PCP and diagnosed with influenza A.  Since then he developed pain in the right side of the chest.  Pain with coughing or breathing.  Decreased appetite with persistent vomiting and diarrhea.             [place] Coma Scale Score: 15                     Patient History   No past medical history on file.  No past surgical history on file.  No family history on file.  Social History     Tobacco Use    Smoking status: Not on file    Smokeless tobacco: Not on file   Substance Use Topics    Alcohol use: Not on file    Drug use: Not on file       Physical Exam   ED Triage Vitals [04/11/24 1921]   Temperature Heart Rate Respirations BP   37.8 °C (100 °F) 98 16 144/77      Pulse Ox Temp Source Heart Rate Source Patient Position   95 % Oral Monitor Sitting      BP Location FiO2 (%)     Right arm --       Physical Exam    Gen:  Well nourished, no acute distress  Head: Normocephalic, atraumatic  Eyes: PERRL, EOMI, conjunctiva clear  ENT: External ears and nose normal, OP clear, Mucosa moist  Neck: Supple, no tenderness, no meningismus  Chest: No tenderness, no crepitus  CV: Regular rate and rhythm, no Murmur  Lungs: Clear bilaterally, no distress  Abd: No tenderness, no rebound or guarding  Extremities: FROM, no edema  Neuro: Cranial nerves intact, moving all 4 extremities, A&O x 4  Psych: Appropriate behavior and affect  Back: No focal tenderness, no CVA tenderness  Skin: No rashes or lesions noted    ED Course & MDM   ED Course as of 04/11/24 7618   Thu Apr  11, 2024 2000 ECG 12 Lead  Performed at  1936, HR of 91, NSR, NAD, QTc 428, no sign of STEMI, no Q wave or T wave abnormality noted.    Reviewed and interpreted by me at time performed   [JM]   2334 EKG was ordered by me and interpreted by me.  Normal sinus rhythm with PACs rate of 91.  Normal TX QT intervals.  Left axis deviation.  Right bundle branch pattern.  No acute ST or T wave changes [AK]   2334 The following diagnostic tests were ordered by me and interpreted by me.  Lactic acid 1.1.  Urinalysis shows ketones with no signs of infection.  Chemistry largely within the limits of her bicarb 22.  CBC is white blood count 19.5, hemoglobin 14.7.  Magnesium 1.9.  Troponin x 2 negative.  Flu COVID-negative. [AK]   2335 X-ray chest was ordered by me, reviewed independently myself interpreted by radiology.    IMPRESSION:  Hazy consolidation right mid lung zone suspicious for pneumonia in  the appropriate clinical setting.   [AK]   2335 CT angio of the chest was ordered by me and interpreted by radiology.    IMPRESSION:  1. No evidence of pulmonary embolism.  2. Geographic consolidative opacities with mild volume loss and air  bronchograms are present in the right upper lobe, with several subtle  nodular opacities also present in the right lower lobe. Findings are  suggestive of pneumonia, although underlying mass is not entirely  excluded. Follow-up imaging after appropriate clinical intervention  is recommended.  3. Mildly enlarged, nonspecific, right hilar and mediastinal lymph  nodes may be reactive, although continued attention to on follow-up  is recommended.  4. 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   [AK]      ED Course User Index  [AK] Enmanuel Currie MD  [JM] Manju Zapien MD         Diagnoses as of 04/11/24 2338   Pneumonia of right lung due to infectious organism, unspecified part of lung   Right-sided chest pain   Dehydration   Patient has been sick  essentially for the past week.  Started with a single episode.  Then progressed to vomiting and fatigue.  Diagnosed with flu on Monday.  Since then has developed right-sided pain with cough and shortness of breath nausea and vomiting.  Has pain with any inspiration.  Pulse and blood pressure were normal saturation was normal.  Clinically there were no other risk factors for pulm emboli but he has persistent pain in the right chest wall with cough or breathing.  CT angiogram was ordered.  This does show evidence of multifocal pneumonia with increased hilar lymphadenopathy.  White blood cell count is elevated but lactic acid within the limits.  Clinically no signs of sepsis does have significant pulm infection with pain decreased oral intake.  Does have ketones in the urine despite 2 L of fluid.  Discussed with patient family.  They are okay with admission.  No signs of acute respiratory failure, pulmonary emboli or sepsis.  Was given Rocephin and Zithromax here for community-acquired pneumonia.  Will admit for further care.  Also already taking Tamiflu    Medical Decision Making      Procedure  Procedures     Enmanuel Currie MD  04/11/24 8705

## 2024-04-12 NOTE — CONSULTS
Vancomycin Dosing by Pharmacy- Cessation of Therapy    Consult to pharmacy for vancomycin dosing has been discontinued by the prescriber, pharmacy will sign off at this time.    Please call pharmacy if there are further questions or re-enter a consult if vancomycin is resumed.     Patrica Oswald, SuhasD

## 2024-04-12 NOTE — PROGRESS NOTES
Arian Iglesias is a 68 y.o. male on day 0 of admission presenting with Pneumonia of right lung due to infectious organism, unspecified part of lung.      Subjective   Patient admitted last night for pneumonia and recent syncopal episode. He was sitting up in his bed eating breakfast. No SOB or respiratory distress noted, on room air. States that he is having right sided pleural pain and endorses a cough. Patient states that he has not had an appetite for about a week with diarrhea. States that he drinks about 2 beers a day but has not had any beer for about a week as well.       Objective     Last Recorded Vitals  /69 (BP Location: Left arm, Patient Position: Sitting)   Pulse 74   Temp 36.5 °C (97.7 °F) (Temporal)   Resp 17   Wt 71.4 kg (157 lb 6.5 oz)   SpO2 93%   Intake/Output last 3 Shifts:    Intake/Output Summary (Last 24 hours) at 4/12/2024 0947  Last data filed at 4/12/2024 0928  Gross per 24 hour   Intake 1880 ml   Output 700 ml   Net 1180 ml       Admission Weight  Weight: 71.4 kg (157 lb 6.5 oz) (04/11/24 1921)    Daily Weight  04/11/24 : 71.4 kg (157 lb 6.5 oz)    Image Results  CT angio chest for pulmonary embolism  Narrative: Interpreted By:  Linwood Lopez,   STUDY:  CT ANGIO CHEST FOR PULMONARY EMBOLISM;  4/11/2024 10:45 pm      INDICATION:  Signs/Symptoms:R CP/infiltrate/syncope.      COMPARISON:  Radiographs of the chest dated 04/11/2024.      ACCESSION NUMBER(S):  [id number]      ORDERING CLINICIAN:  LAWRENCE NYE      TECHNIQUE:  Helical data acquisition of the chest was obtained after intravenous  administration of 75 mLof Omnipaque 350, as per PE protocol. Images  were reformatted in coronal and sagittal planes. Axial and coronal  maximum intensity projection (MIP) images were created and reviewed.      FINDINGS:  POTENTIAL LIMITATIONS OF THE STUDY: None      HEART AND VESSELS:  There are no discrete filling defects within main pulmonary artery  and its branches to suggest  acute pulmonary embolism. Main pulmonary  artery and its branches are normal in caliber.      Ascending thoracic aorta is mildly ectatic, measuring up to 3.7 cm in  diameter, and demonstrates mild vascular calcifications. Although,  the study is not tailored for evaluation of aorta, there is no  definite evidence of acute aortic pathology. Minimal coronary artery  calcifications are seen. Please note, the study is not optimized for  evaluation of coronary arteries.      The cardiac chambers are not enlarged.There are no findings to  suggest right heart strain. There is no pericardial effusion seen.      MEDIASTINUM AND MARKUS, LOWER NECK AND AXILLA:  The visualized thyroid gland is within normal limits.  Several mildly enlarged right hilar and mediastinal lymph nodes are  present, measuring up to 1.4 cm in diameter. Esophagus appears within  normal limits as seen.      LUNGS AND AIRWAYS:  The trachea and central airways are patent. No endobronchial lesion  is seen.      Geographic areas of consolidation with some volume loss and air  bronchograms are present in the right upper lobe. Several additional  subtle nodular opacities are present in the right middle lobe.      Trace right-sided pleural effusion is present.      No consolidation or pleural effusion is identified in the left lung.      UPPER ABDOMEN:  The visualized subdiaphragmatic structures demonstrate no remarkable  findings.      CHEST WALL AND OSSEOUS STRUCTURES:  Chest wall is within normal limits.  No acute osseous pathology.There are no suspicious osseous  lesions.Mild multilevel degenerative changes are present in the  thoracic spine.      Impression: 1. No evidence of pulmonary embolism.  2. Geographic consolidative opacities with mild volume loss and air  bronchograms are present in the right upper lobe, with several subtle  nodular opacities also present in the right lower lobe. Findings are  suggestive of pneumonia, although underlying mass is not  entirely  excluded. Follow-up imaging after appropriate clinical intervention  is recommended.  3. Mildly enlarged, nonspecific, right hilar and mediastinal lymph  nodes may be reactive, although continued attention to on follow-up  is recommended.  4. 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          MACRO:  None      Signed by: Linwood Lopez 4/11/2024 11:13 PM  Dictation workstation:   [id number]  XR chest 1 view  Narrative: Interpreted By:  Stacy Lazar,   STUDY:  XR CHEST 1 VIEW;  4/11/2024 7:55 pm      INDICATION:  Signs/Symptoms:syncope.      COMPARISON:  07/13/2021      ACCESSION NUMBER(S):  [id number]      ORDERING CLINICIAN:  MONIK SEN      FINDINGS:  Hazy consolidation right mid lung zone suspicious for pneumonia in  the appropriate clinical setting. Streaky bibasilar atelectasis. No  pleural effusion or pneumothorax. Normal heart size. No acute osseous  abnormality. ACDF hardware.      Impression: Hazy consolidation right mid lung zone suspicious for pneumonia in  the appropriate clinical setting.      Signed by: Stacy Lazar 4/11/2024 8:27 PM  Dictation workstation:   [id number]      Physical Exam  Vitals and nursing note reviewed.   Constitutional:       Appearance: Normal appearance.   HENT:      Nose: Nose normal.      Mouth/Throat:      Mouth: Mucous membranes are moist.   Eyes:      Pupils: Pupils are equal, round, and reactive to light.   Cardiovascular:      Rate and Rhythm: Normal rate and regular rhythm.      Pulses: Normal pulses.      Heart sounds: Normal heart sounds.   Pulmonary:      Effort: Pulmonary effort is normal.      Comments: Diminished   Chest:      Chest wall: Tenderness present.   Abdominal:      General: Abdomen is flat. Bowel sounds are normal.      Palpations: Abdomen is soft.   Musculoskeletal:         General: Normal range of motion.      Cervical back: Normal range of motion.   Skin:     General: Skin  is warm and dry.      Capillary Refill: Capillary refill takes less than 2 seconds.   Neurological:      General: No focal deficit present.      Mental Status: He is alert and oriented to person, place, and time.   Psychiatric:         Mood and Affect: Mood normal.         Behavior: Behavior normal.           Relevant Results  Lab Results   Component Value Date    GLUCOSE 97 04/12/2024    CALCIUM 8.1 (L) 04/12/2024     04/12/2024    K 3.4 04/12/2024    CO2 23 (L) 04/12/2024     04/12/2024    BUN 9 04/12/2024    CREATININE 0.80 04/12/2024      Lab Results   Component Value Date    WBC 12.1 (H) 04/12/2024    HGB 12.3 (L) 04/12/2024    HCT 35.7 (L) 04/12/2024    MCV 93 04/12/2024     04/12/2024        Assessment/Plan   Pneumonia of right lung   -Continuous pulse ox monitoring, on room air  -Continue IV Antibiotics   -Follow-up imaging of chest after clinical intervention   -Pulmonology to follow    SIRS (leukocytosis and febrile)  -Monitor WBC and temperature  -Continue IV fluids   -Blood cultures pending  -ID to follow     Influenza type A  -Continue Tamiflu  -Isolation    Recent Syncope Episode   -Monitor on telemetry  -Orthostatic blood pressures  -Fall precaution  -Cardiology to follow     Alcohol Abuse   States that he drinks 2 beers a day, last drink was approximately a week ago. Denies history of alcohol withdrawal.   -No alcohol level done in ED  -Monitor for signs and symptoms for withdrawal     Diarrhea  -Stool sample pending    DVT prophylaxis  -Lovenox     Disposition: Patient to return home with no needs on discharge.         HANG Deutsch-CNP

## 2024-04-12 NOTE — CONSULTS
Vancomycin Dosing by Pharmacy- INITIAL    Arian Iglesias is a 68 y.o. year old male who Pharmacy has been consulted for vancomycin dosing for pneumonia. Based on the patient's indication and renal status this patient will be dosed based on a goal AUC of 400-600.     Renal function is currently stable.    Visit Vitals  /80   Pulse 83   Temp 37.8 °C (100 °F) (Oral)   Resp 15        Lab Results   Component Value Date    CREATININE 0.80 04/11/2024    CREATININE 0.9 06/20/2020        Patient weight is 71 kg      Assessment/Plan     Patient will not be given a loading dose.  Will initiate vancomycin maintenance,  1000 mg every 12 hours.    This dosing regimen is predicted by [employer] to result in the following pharmacokinetic parameters:  Loading dose: N/A  Regimen: 1000 mg IV every 12 hours.  Start time: 00:45 on 04/12/2024  Exposure target: AUC24 (range)400-600 mg/L.hr   AUC24,ss: 544 mg/L.hr  Probability of AUC24 > 400: 81 %  Ctrough,ss: 17.2 mg/L  Probability of Ctrough,ss > 20: 36 %  Probability of nephrotoxicity (Lodise MONICA 2009): 13 %      Follow-up level will be ordered on 4/12/24 at 1000 unless clinically indicated sooner.  Will continue to monitor renal function daily while on vancomycin and order serum creatinine at least every 48 hours if not already ordered.  Follow for continued vancomycin needs, clinical response, and signs/symptoms of toxicity.       Tika Barros, [facility]

## 2024-04-12 NOTE — PROGRESS NOTES
04/12/24 1541   Discharge Planning   Living Arrangements Spouse/significant other   Support Systems Spouse/significant other   Assistance Needed independent   Type of Residence Private residence   Number of Stairs to Enter Residence 3   Number of Stairs Within Residence 20   Do you have animals or pets at home? Yes   Type of Animals or Pets 3 dogs, 1 cat   Who is requesting discharge planning? Provider   Home or Post Acute Services None   Patient expects to be discharged to: home   Does the patient need discharge transport arranged? No   Patient Choice   Patient / Family choosing to utilize agency / facility established prior to hospitalization No     Likely home no needs.  Independent at home.

## 2024-04-12 NOTE — CONSULTS
Inpatient consult to Infectious Diseases  Consult performed by: Shelbi Prieto, APRN-CNP  Consult ordered by: Lady Gatica MD  Reason for consult: flu, pneumonia          Primary MD: Vidal Alva DO        History Of Present Illness  Arian Iglesias is a 68 y.o. male past medical history of daily alcohol use, cannabis use.  He presented to the emergency room with right-sided chest pain.  He reports he started becoming ill about 2 weeks ago.  He originally reported shortness of breath, cough, nasal congestion.  He was seen by his PCP who diagnosed With influenza A.  He tested positive influenza RSV on 4/8/2024  He reports he was treated and felt better.  He returned to work for 1 week.  He reports then he started having upper respiratory symptoms again.  He reported chest pain with coughing and deep breathing.  He denies fever chills.  Reports some nausea that has resolved.  He reported diarrhea that has resolved he has had no bowel movement in 48 hours.  He denies shortness of breath.  He is on room air with normal oxygenation.  He is currently afebrile, with maximum temperature recorded was 37.8 °C.  Labs with resolved leukocytosis.  Unremarkable urinalysis.  Covid,  influenza RSV PCR negative.  ET angio with no evidence of pulmonary embolism, little nodular opacities in right lower lobe.  Mildly enlarged nonspecific right hilar and mediastinal lymph nodes.  He is on IV azithromycin, Zosyn, vancomycin, Tamiflu.      Past Medical History  He has no past medical history on file.    Surgical History  He has no past surgical history on file.     Social History     Occupational History    Not on file   Tobacco Use    Smoking status: Former     Types: Cigarettes     Passive exposure: Past    Smokeless tobacco: Never   Substance and Sexual Activity    Alcohol use: Not on file    Drug use: Not on file    Sexual activity: Not on file     Travel History   Travel since 03/12/24    No documented travel since  "03/12/24              Family History  No family history on file.  Allergies  Patient has no known allergies.     Immunization History   Administered Date(s) Administered    Pfizer Purple Cap SARS-CoV-2 03/27/2021, 04/18/2021    Tdap vaccine, age 7 year and older (BOOSTRIX, ADACEL) 05/24/2015     Medications  Home medications:  Medications Prior to Admission   Medication Sig Dispense Refill Last Dose    oseltamivir (Tamiflu) 75 mg capsule Take 1 capsule (75 mg) by mouth 2 times a day for 5 days. 10 capsule 0      Current medications:  Scheduled medications  [START ON 4/13/2024] azithromycin, 500 mg, intravenous, q24h  enoxaparin, 40 mg, subcutaneous, Nightly  folic acid, 1 mg, oral, Daily  multivitamin with minerals, 1 tablet, oral, Daily  pantoprazole, 40 mg, oral, Daily before breakfast   Or  pantoprazole, 40 mg, intravenous, Daily before breakfast  piperacillin-tazobactam, 4.5 g, intravenous, q6h  polyethylene glycol, 17 g, oral, Daily  [START ON 4/15/2024] thiamine, 100 mg, oral, Daily  thiamine, 500 mg, intravenous, q8h DES  vancomycin, 1,000 mg, intravenous, q12h      Continuous medications  sodium chloride 0.9%, 100 mL/hr, Last Rate: 100 mL/hr (04/12/24 0926)      PRN medications  PRN medications: acetaminophen **OR** acetaminophen **OR** acetaminophen, diphenhydrAMINE, ipratropium-albuteroL, oxygen, vancomycin    Review of Systems   Constitutional: Negative.    HENT: Negative.     Eyes: Negative.    Respiratory:  Positive for cough.         Pleuritic pain with deep breathing and coughing    Cardiovascular: Negative.    Gastrointestinal: Negative.    Endocrine: Negative.    Genitourinary: Negative.    Musculoskeletal: Negative.    Skin: Negative.    Neurological: Negative.    Psychiatric/Behavioral: Negative.          Objective  Range of Vitals (last 24 hours)  Heart Rate:  [72-98]   Temp:  [36.5 °C (97.7 °F)-37.8 °C (100 °F)]   Resp:  [15-17]   BP: (100-144)/(54-80)   Height:  [167.6 cm (5' 6\")]   Weight:  " [71.4 kg (157 lb 6.5 oz)]   SpO2:  [93 %-98 %]   Daily Weight  04/11/24 : 71.4 kg (157 lb 6.5 oz)    Body mass index is 25.41 kg/m².     Physical Exam  Constitutional:       Appearance: Normal appearance.   HENT:      Head: Normocephalic and atraumatic.      Nose: Nose normal.      Mouth/Throat:      Mouth: Mucous membranes are moist.      Pharynx: Oropharynx is clear.   Eyes:      Conjunctiva/sclera: Conjunctivae normal.   Cardiovascular:      Rate and Rhythm: Normal rate and regular rhythm.   Pulmonary:      Comments: Decreased breath sounds bilateral   Abdominal:      General: Bowel sounds are normal.      Palpations: Abdomen is soft.   Musculoskeletal:         General: Normal range of motion.      Cervical back: Normal range of motion and neck supple.   Skin:     General: Skin is warm and dry.      Findings: Rash present.      Comments: Pruritic rash upper back   Neurological:      Mental Status: He is alert.      Comments: Awake, alert   Psychiatric:         Mood and Affect: Mood normal.         Behavior: Behavior normal.          Relevant Results  Outside Hospital Results  No  Labs  Results from last 72 hours   Lab Units 04/12/24  0502 04/11/24 1950   WBC AUTO x10*3/uL 12.1* 19.5*   HEMOGLOBIN g/dL 12.3* 14.7   HEMATOCRIT % 35.7* 41.7   PLATELETS AUTO x10*3/uL 204 239   NEUTROS PCT AUTO %  --  81.7   LYMPHS PCT AUTO %  --  7.1   MONOS PCT AUTO %  --  10.0   EOS PCT AUTO %  --  0.1     Results from last 72 hours   Lab Units 04/12/24  0502 04/11/24 1950   SODIUM mmol/L 135 132*   POTASSIUM mmol/L 3.4 3.6   CHLORIDE mmol/L 102 97   CO2 mmol/L 23* 22*   BUN mg/dL 9 10   CREATININE mg/dL 0.80 0.80   GLUCOSE mg/dL 97 129*   CALCIUM mg/dL 8.1* 9.4   ANION GAP mmol/L 10 13   EGFR mL/min/1.73m*2 >90 >90     Results from last 72 hours   Lab Units 04/12/24  0502 04/11/24 1950   ALK PHOS U/L 85 92   BILIRUBIN TOTAL mg/dL 0.6 1.0   PROTEIN TOTAL g/dL 6.0 7.9   ALT U/L 22 31   AST U/L 17 23   ALBUMIN g/dL 2.6* 3.7  "    Estimated Creatinine Clearance: 79.8 mL/min (by C-G formula based on SCr of 0.8 mg/dL).  No results found for: \"CRP\", \"SEDRATE\"  No results found for: \"HIV1X2\", \"HIVCONF\", \"[id number]\"  No results found for: \"HEPCABINIT\", \"HEPCAB\", \"HCVPCRQUANT\"  Microbiology    Urine antigen pending  streptococcus pneumonia antigen pending  Blood cultures pending        Imaging  CT angio chest for pulmonary embolism    Result Date: 4/11/2024  Interpreted By:  Linwood Lopez, STUDY: CT ANGIO CHEST FOR PULMONARY EMBOLISM;  4/11/2024 10:45 pm   INDICATION: Signs/Symptoms:R CP/infiltrate/syncope.   COMPARISON: Radiographs of the chest dated 04/11/2024.   ACCESSION NUMBER(S): [id number]   ORDERING CLINICIAN: LAWRENCE NYE   TECHNIQUE: Helical data acquisition of the chest was obtained after intravenous administration of 75 mLof Omnipaque 350, as per PE protocol. Images were reformatted in coronal and sagittal planes. Axial and coronal maximum intensity projection (MIP) images were created and reviewed.   FINDINGS: POTENTIAL LIMITATIONS OF THE STUDY: None   HEART AND VESSELS: There are no discrete filling defects within main pulmonary artery and its branches to suggest acute pulmonary embolism. Main pulmonary artery and its branches are normal in caliber.   Ascending thoracic aorta is mildly ectatic, measuring up to 3.7 cm in diameter, and demonstrates mild vascular calcifications. Although, the study is not tailored for evaluation of aorta, there is no definite evidence of acute aortic pathology. Minimal coronary artery calcifications are seen. Please note, the study is not optimized for evaluation of coronary arteries.   The cardiac chambers are not enlarged.There are no findings to suggest right heart strain. There is no pericardial effusion seen.   MEDIASTINUM AND MARKUS, LOWER NECK AND AXILLA: The visualized thyroid gland is within normal limits. Several mildly enlarged right hilar and mediastinal lymph nodes are present, " measuring up to 1.4 cm in diameter. Esophagus appears within normal limits as seen.   LUNGS AND AIRWAYS: The trachea and central airways are patent. No endobronchial lesion is seen.   Geographic areas of consolidation with some volume loss and air bronchograms are present in the right upper lobe. Several additional subtle nodular opacities are present in the right middle lobe.   Trace right-sided pleural effusion is present.   No consolidation or pleural effusion is identified in the left lung.   UPPER ABDOMEN: The visualized subdiaphragmatic structures demonstrate no remarkable findings.   CHEST WALL AND OSSEOUS STRUCTURES: Chest wall is within normal limits. No acute osseous pathology.There are no suspicious osseous lesions.Mild multilevel degenerative changes are present in the thoracic spine.       1. No evidence of pulmonary embolism. 2. Geographic consolidative opacities with mild volume loss and air bronchograms are present in the right upper lobe, with several subtle nodular opacities also present in the right lower lobe. Findings are suggestive of pneumonia, although underlying mass is not entirely excluded. Follow-up imaging after appropriate clinical intervention is recommended. 3. Mildly enlarged, nonspecific, right hilar and mediastinal lymph nodes may be reactive, although continued attention to on follow-up is recommended. 4. Mild ectasia of the ascending thoracic aorta, measuring up to 3.7 cm in diameter.     MACRO: None   Signed by: Linwood oLpez 4/11/2024 11:13 PM Dictation workstation:   [id number]    XR chest 1 view    Result Date: 4/11/2024  Interpreted By:  Stacy Lazar, STUDY: XR CHEST 1 VIEW;  4/11/2024 7:55 pm   INDICATION: Signs/Symptoms:syncope.   COMPARISON: 07/13/2021   ACCESSION NUMBER(S): [id number]   ORDERING CLINICIAN: MONIK SEN   FINDINGS: Hazy consolidation right mid lung zone suspicious for pneumonia in the appropriate clinical setting. Streaky bibasilar  atelectasis. No pleural effusion or pneumothorax. Normal heart size. No acute osseous abnormality. ACDF hardware.       Hazy consolidation right mid lung zone suspicious for pneumonia in the appropriate clinical setting.   Signed by: Stacy Lazar 4/11/2024 8:27 PM Dictation workstation:   [id number]      Assessment/Plan   Influenza A-positive 4/8, negative 4/11-treated with tamiflu  Possible post influenza pneumonia  Resolving leukocytosis  Urine tox screen positive for cannabis, opiate  Diarrhea-likely secondary to viral illness- resolved-none in 48 hours  Pruritic Rash-back, resolving      Discontinue IV Azithromycin  Discontinue IV vancomycin-can restart if warranted   IV Zosyn  Monitor temperature and WBC  Follow blood culture  Follow Legionella urine antigen  Follow strep pneumonia antigen  Nasal MRSA PCR  C. Difficile/stool pathogen PCR if diarrhea persists  Monitor oxygenation  Supportive care  Maintain droplet isolation for 7 days from positive PCR on 4/8/2024      Shelbi Prieto, APRN-CNP

## 2024-04-12 NOTE — H&P
History Of Present Illness  Arian Iglesias is a 68 y.o. male presenting with right-sided chest pain.  This patient is not a very good historian.  Apparently last week he had an episode when he syncopized.  He and wife cannot give me a lot of details of that.  On Monday he came down with upper respiratory tract symptoms diagnosed with the flu and started Tamiflu on Tuesday.  He however continued to have chills cough etc. developed significant right-sided pleuritic chest pain.  For this reason scanned emergency room here.  CT angio showed significant amount of pneumonia on the right side.  No pulmonary embolism he is mildly hypoxic around 9091% on room air  No resting chest pain no leg swelling.  He admits to diarrhea which he has had even before the Tamiflu was started  past Medical History  He has no past medical history on file.  Denies primary problem  Surgical History  He has no past surgical history on file.  Reviewed  Social History  He has no history on file for tobacco use, alcohol use, and drug use.  He quit smoking 10 years ago.  He drinks 2-3 beers daily  Family History  No family history on file.  Review  Allergies  Patient has no known allergies.    ROS  Review of Systems   Constitutional:  Positive for chills.   HENT: Negative.     Eyes: Negative.    Respiratory:  Positive for cough and chest tightness.    Cardiovascular:  Positive for chest pain.   Gastrointestinal:  Positive for diarrhea.   Endocrine: Negative.    Genitourinary: Negative.    Musculoskeletal: Negative.    Skin: Negative.    Allergic/Immunologic: Negative.    Neurological: Negative.    Hematological: Negative.    Psychiatric/Behavioral: Negative.     All other systems reviewed and are negative.       Last Recorded Vitals  /80   Pulse 83   Temp 37.8 °C (100 °F) (Oral)   Resp 15   Wt 71.4 kg (157 lb 6.5 oz)   SpO2 94%     Physical Exam  Assessed patient emergency room bed #4 in the presence of his wife.  This is a   male who is on room air no acute distress eating a sandwich speaks in full sentences  Normocephalic atraumatic EOMI PERRLA  Neck supple  Chest bronchial breath sounds on the right left is clear  Heart regular S1-S2 distant  Abdomen soft nontender benign exam no rebound no guarding  Extremities nonperforating good pedal pulses  Neurologic examination gross motor sensor nonfocal  Skin warm dry no rash  Psych no psychosis    Relevant Results  I could not locate his EKG CMP okay white blood cell count 19.5 flu today negative but was +4 days ago COVID-negative UA negative CT showing multilobar pneumonia on the right troponins were 9 and 10    ASSESSMENT/PLAN  Assessment/Plan   Principal Problem:    Pneumonia of right lung due to infectious organism, unspecified part of lung    Impression is for:  1.  Acute respiratory failure  2.  Pneumonia suspected bacterial superimposed on flow  3.  Influenza type A  4.  Diarrhea  5.  Recent syncopal episode  6.  Dehydration  8.  Sepsis    Plan is for:  IV fluids isolation continue Tamiflu broad-spectrum biotic coverage including vancomycin for pneumonia check C. difficile stools for his diarrhea monitor he apparently has no nausea vomiting as he is eating a sandwich; this was his reported syncopal episode we will keep a monitor consult cardiology and check an EKG       Lady Gatica MD

## 2024-04-12 NOTE — CARE PLAN
Problem: Safety - Adult  Goal: Free from fall injury  Outcome: Progressing  Flowsheets (Taken 4/12/2024 0616)  Free from fall injury:   Instruct family/caregiver on patient safety   Based on caregiver fall risk screen, instruct family/caregiver to ask for assistance with transferring infant if caregiver noted to have fall risk factors     Problem: Discharge Planning  Goal: Discharge to home or other facility with appropriate resources  Outcome: Progressing  Flowsheets (Taken 4/12/2024 0616)  Discharge to home or other facility with appropriate resources: Identify barriers to discharge with patient and caregiver     Problem: Pain  Goal: Takes deep breaths with improved pain control throughout the shift  Outcome: Progressing  Goal: Turns in bed with improved pain control throughout the shift  Outcome: Progressing  Goal: Walks with improved pain control throughout the shift  Outcome: Progressing  Goal: Performs ADL's with improved pain control throughout shift  Outcome: Progressing  Goal: Free from opioid side effects throughout the shift  Outcome: Progressing  Goal: Free from acute confusion related to pain meds throughout the shift  Outcome: Progressing   The patient's goals for the shift include  pain management    The clinical goals for the shift include monitor labs, monitor oxygen demand, IV antibotics      04/12/24 at 6:17 AM - Noemí Altamirano RN

## 2024-04-12 NOTE — CARE PLAN
The patient's goals for the shift include  monitor labs, monitor oxygen, wean o2, IV antibiotics  Problem: Safety - Adult  Goal: Free from fall injury  Outcome: Progressing     Problem: Discharge Planning  Goal: Discharge to home or other facility with appropriate resources  Outcome: Progressing     Problem: Pain  Goal: Takes deep breaths with improved pain control throughout the shift  Outcome: Progressing  Goal: Turns in bed with improved pain control throughout the shift  Outcome: Progressing  Goal: Walks with improved pain control throughout the shift  Outcome: Progressing  Goal: Performs ADL's with improved pain control throughout shift  Outcome: Progressing  Goal: Participates in PT with improved pain control throughout the shift  Outcome: Progressing  Goal: Free from opioid side effects throughout the shift  Outcome: Progressing  Goal: Free from acute confusion related to pain meds throughout the shift  Outcome: Progressing       The clinical goals for the shift include monitor labs, monitor oxygen demand, IV antibotics

## 2024-04-13 VITALS
WEIGHT: 157.41 LBS | RESPIRATION RATE: 16 BRPM | SYSTOLIC BLOOD PRESSURE: 126 MMHG | OXYGEN SATURATION: 98 % | HEART RATE: 92 BPM | DIASTOLIC BLOOD PRESSURE: 72 MMHG | TEMPERATURE: 98.6 F | HEIGHT: 66 IN | BODY MASS INDEX: 25.3 KG/M2

## 2024-04-13 LAB
ANION GAP SERPL CALC-SCNC: 9 MMOL/L
BUN SERPL-MCNC: 10 MG/DL (ref 8–25)
C COLI+JEJ+UPSA DNA STL QL NAA+PROBE: NOT DETECTED
CALCIUM SERPL-MCNC: 8.5 MG/DL (ref 8.5–10.4)
CHLORIDE SERPL-SCNC: 103 MMOL/L (ref 97–107)
CO2 SERPL-SCNC: 24 MMOL/L (ref 24–31)
CREAT SERPL-MCNC: 0.8 MG/DL (ref 0.4–1.6)
EC STX1 GENE STL QL NAA+PROBE: NOT DETECTED
EC STX2 GENE STL QL NAA+PROBE: NOT DETECTED
EGFRCR SERPLBLD CKD-EPI 2021: >90 ML/MIN/1.73M*2
ERYTHROCYTE [DISTWIDTH] IN BLOOD BY AUTOMATED COUNT: 13.2 % (ref 11.5–14.5)
GLUCOSE SERPL-MCNC: 88 MG/DL (ref 65–99)
HCT VFR BLD AUTO: 39.4 % (ref 41–52)
HGB BLD-MCNC: 13.6 G/DL (ref 13.5–17.5)
MCH RBC QN AUTO: 31.9 PG (ref 26–34)
MCHC RBC AUTO-ENTMCNC: 34.5 G/DL (ref 32–36)
MCV RBC AUTO: 93 FL (ref 80–100)
NOROVIRUS GI + GII RNA STL NAA+PROBE: NOT DETECTED
NRBC BLD-RTO: 0 /100 WBCS (ref 0–0)
PLATELET # BLD AUTO: 230 X10*3/UL (ref 150–450)
POTASSIUM SERPL-SCNC: 3.5 MMOL/L (ref 3.4–5.1)
RBC # BLD AUTO: 4.26 X10*6/UL (ref 4.5–5.9)
RV RNA STL NAA+PROBE: NOT DETECTED
SALMONELLA DNA STL QL NAA+PROBE: NOT DETECTED
SHIGELLA DNA SPEC QL NAA+PROBE: NOT DETECTED
SODIUM SERPL-SCNC: 136 MMOL/L (ref 133–145)
V CHOLERAE DNA STL QL NAA+PROBE: NOT DETECTED
WBC # BLD AUTO: 6.5 X10*3/UL (ref 4.4–11.3)
Y ENTEROCOL DNA STL QL NAA+PROBE: NOT DETECTED

## 2024-04-13 PROCEDURE — 82374 ASSAY BLOOD CARBON DIOXIDE: CPT | Performed by: NURSE PRACTITIONER

## 2024-04-13 PROCEDURE — 2500000004 HC RX 250 GENERAL PHARMACY W/ HCPCS (ALT 636 FOR OP/ED): Performed by: INTERNAL MEDICINE

## 2024-04-13 PROCEDURE — 36415 COLL VENOUS BLD VENIPUNCTURE: CPT | Performed by: NURSE PRACTITIONER

## 2024-04-13 PROCEDURE — 99232 SBSQ HOSP IP/OBS MODERATE 35: CPT | Performed by: NURSE PRACTITIONER

## 2024-04-13 PROCEDURE — 85027 COMPLETE CBC AUTOMATED: CPT | Performed by: NURSE PRACTITIONER

## 2024-04-13 PROCEDURE — 2500000001 HC RX 250 WO HCPCS SELF ADMINISTERED DRUGS (ALT 637 FOR MEDICARE OP): Performed by: INTERNAL MEDICINE

## 2024-04-13 PROCEDURE — G0378 HOSPITAL OBSERVATION PER HR: HCPCS

## 2024-04-13 RX ORDER — CEFUROXIME AXETIL 500 MG/1
500 TABLET ORAL 2 TIMES DAILY
Qty: 14 TABLET | Refills: 0 | Status: SHIPPED | OUTPATIENT
Start: 2024-04-13 | End: 2024-04-20

## 2024-04-13 RX ORDER — ACETAMINOPHEN 325 MG/1
650 TABLET ORAL EVERY 4 HOURS PRN
Start: 2024-04-13

## 2024-04-13 RX ORDER — AZITHROMYCIN 500 MG/1
500 TABLET, FILM COATED ORAL DAILY
Qty: 7 TABLET | Refills: 0 | Status: SHIPPED | OUTPATIENT
Start: 2024-04-13 | End: 2024-04-20

## 2024-04-13 RX ADMIN — ACETAMINOPHEN 650 MG: 325 TABLET ORAL at 04:34

## 2024-04-13 RX ADMIN — PIPERACILLIN SODIUM AND TAZOBACTAM SODIUM 4.5 G: 4; .5 INJECTION, SOLUTION INTRAVENOUS at 01:23

## 2024-04-13 RX ADMIN — SODIUM CHLORIDE 100 ML/HR: 900 INJECTION, SOLUTION INTRAVENOUS at 09:00

## 2024-04-13 RX ADMIN — Medication 1 TABLET: at 08:55

## 2024-04-13 RX ADMIN — THIAMINE HYDROCHLORIDE 500 MG: 100 INJECTION, SOLUTION INTRAMUSCULAR; INTRAVENOUS at 08:54

## 2024-04-13 RX ADMIN — FOLIC ACID 1 MG: 1 TABLET ORAL at 08:55

## 2024-04-13 RX ADMIN — PIPERACILLIN SODIUM AND TAZOBACTAM SODIUM 4.5 G: 4; .5 INJECTION, SOLUTION INTRAVENOUS at 08:54

## 2024-04-13 RX ADMIN — PANTOPRAZOLE SODIUM 40 MG: 40 TABLET, DELAYED RELEASE ORAL at 08:55

## 2024-04-13 ASSESSMENT — COGNITIVE AND FUNCTIONAL STATUS - GENERAL
MOBILITY SCORE: 24
DAILY ACTIVITIY SCORE: 24

## 2024-04-13 ASSESSMENT — PAIN SCALES - GENERAL
PAINLEVEL_OUTOF10: 6
PAINLEVEL_OUTOF10: 0 - NO PAIN
PAINLEVEL_OUTOF10: 0 - NO PAIN

## 2024-04-13 ASSESSMENT — PAIN DESCRIPTION - LOCATION: LOCATION: HEAD

## 2024-04-13 NOTE — CONSULTS
"Inpatient consult to Pulmonology  Consult performed by: Vitor Cooley MD  Consult ordered by: Lady Gatica MD          Pulmonary Consult    Reason For Consult  Pneumonia  History Of Present Illness  Arian Iglesias is a 68 y.o. male presenting with Pneumonia of right lung due to infectious organism, unspecified part of lung.   States he had upper respite tract infection diagnosis of flu status Tamiflu Tuesday.  An episode of syncope as an outpatient.  Is having persistent symptoms right-sided pleuritic chest pain.  Present emergency room.  CT scan shows right-sided pneumonia.  He is initially pulse ox of 90-91 on room air.  Admitted to floor for observation.    Past Medical History  He has no past medical history on file.    Surgical History  He has no past surgical history on file.     Social History  He reports that he has quit smoking. His smoking use included cigarettes. He has been exposed to tobacco smoke. He has never used smokeless tobacco. No history on file for alcohol use and drug use.      Family History  No family history on file.     Allergies  Patient has no known allergies.    Review of Systems   All other systems reviewed and are negative.      Last Recorded Vitals  Blood pressure 122/71, pulse 72, temperature 36.8 °C (98.2 °F), temperature source Temporal, resp. rate 16, height 1.676 m (5' 6\"), weight 71.4 kg (157 lb 6.5 oz), SpO2 95%.    Intake/Output    Intake/Output Summary (Last 24 hours) at 4/12/2024 2128  Last data filed at 4/12/2024 2031  Gross per 24 hour   Intake 2921.67 ml   Output 1350 ml   Net 1571.67 ml       Physical Exam  Vitals reviewed.   Constitutional:       Appearance: Normal appearance.   HENT:      Head: Normocephalic and atraumatic.      Mouth/Throat:      Mouth: Mucous membranes are moist.   Eyes:      Extraocular Movements: Extraocular movements intact.      Pupils: Pupils are equal, round, and reactive to light.   Cardiovascular:      Rate and Rhythm: Normal " rate and regular rhythm.   Pulmonary:      Effort: Pulmonary effort is normal.      Breath sounds: Normal breath sounds and air entry.   Abdominal:      General: Abdomen is flat. Bowel sounds are normal.      Palpations: Abdomen is soft.   Musculoskeletal:         General: Normal range of motion.      Cervical back: Normal range of motion and neck supple.   Skin:     General: Skin is warm and dry.   Neurological:      General: No focal deficit present.      Mental Status: He is alert and oriented to person, place, and time. Mental status is at baseline.      ...    Oxygen Therapy  SpO2: 95 %  Medical Gas Therapy: None (Room air)          Lines and Tubes:  Peripheral IV 04/11/24 20 G Right Antecubital (Active)   Placement Date/Time: 04/11/24 1950   Size (Gauge): 20 G  Orientation: Right  Location: Antecubital   Number of days: 1         Scheduled medications  enoxaparin, 40 mg, subcutaneous, Nightly  folic acid, 1 mg, oral, Daily  multivitamin with minerals, 1 tablet, oral, Daily  pantoprazole, 40 mg, oral, Daily before breakfast   Or  pantoprazole, 40 mg, intravenous, Daily before breakfast  piperacillin-tazobactam, 4.5 g, intravenous, q6h  polyethylene glycol, 17 g, oral, Daily  [START ON 4/15/2024] thiamine, 100 mg, oral, Daily  thiamine, 500 mg, intravenous, q8h DES      Continuous medications  sodium chloride 0.9%, 100 mL/hr, Last Rate: 100 mL/hr (04/12/24 2001)      PRN medications  PRN medications: acetaminophen **OR** acetaminophen **OR** acetaminophen, diphenhydrAMINE, ipratropium-albuteroL, oxygen    Relevant Results  Results from last 7 days   Lab Units 04/12/24  0502 04/11/24 1950   WBC AUTO x10*3/uL 12.1* 19.5*   HEMOGLOBIN g/dL 12.3* 14.7   HEMATOCRIT % 35.7* 41.7   PLATELETS AUTO x10*3/uL 204 239      Results from last 7 days   Lab Units 04/12/24  0502 04/11/24 1950   SODIUM mmol/L 135 132*   POTASSIUM mmol/L 3.4 3.6   CHLORIDE mmol/L 102 97   CO2 mmol/L 23* 22*   BUN mg/dL 9 10   CREATININE mg/dL  0.80 0.80   GLUCOSE mg/dL 97 129*   CALCIUM mg/dL 8.1* 9.4          CT angio chest for pulmonary embolism 04/11/2024    Narrative  Interpreted By:  Linwood Lopez,  STUDY:  CT ANGIO CHEST FOR PULMONARY EMBOLISM;  4/11/2024 10:45 pm    INDICATION:  Signs/Symptoms:R CP/infiltrate/syncope.    COMPARISON:  Radiographs of the chest dated 04/11/2024.    ACCESSION NUMBER(S):  [id number]    ORDERING CLINICIAN:  LAWRENCE NYE    TECHNIQUE:  Helical data acquisition of the chest was obtained after intravenous  administration of 75 mLof Omnipaque 350, as per PE protocol. Images  were reformatted in coronal and sagittal planes. Axial and coronal  maximum intensity projection (MIP) images were created and reviewed.    FINDINGS:  POTENTIAL LIMITATIONS OF THE STUDY: None    HEART AND VESSELS:  There are no discrete filling defects within main pulmonary artery  and its branches to suggest acute pulmonary embolism. Main pulmonary  artery and its branches are normal in caliber.    Ascending thoracic aorta is mildly ectatic, measuring up to 3.7 cm in  diameter, and demonstrates mild vascular calcifications. Although,  the study is not tailored for evaluation of aorta, there is no  definite evidence of acute aortic pathology. Minimal coronary artery  calcifications are seen. Please note, the study is not optimized for  evaluation of coronary arteries.    The cardiac chambers are not enlarged.There are no findings to  suggest right heart strain. There is no pericardial effusion seen.    MEDIASTINUM AND MARKUS, LOWER NECK AND AXILLA:  The visualized thyroid gland is within normal limits.  Several mildly enlarged right hilar and mediastinal lymph nodes are  present, measuring up to 1.4 cm in diameter. Esophagus appears within  normal limits as seen.    LUNGS AND AIRWAYS:  The trachea and central airways are patent. No endobronchial lesion  is seen.    Geographic areas of consolidation with some volume loss and air  bronchograms are  present in the right upper lobe. Several additional  subtle nodular opacities are present in the right middle lobe.    Trace right-sided pleural effusion is present.    No consolidation or pleural effusion is identified in the left lung.    UPPER ABDOMEN:  The visualized subdiaphragmatic structures demonstrate no remarkable  findings.    CHEST WALL AND OSSEOUS STRUCTURES:  Chest wall is within normal limits.  No acute osseous pathology.There are no suspicious osseous  lesions.Mild multilevel degenerative changes are present in the  thoracic spine.    Impression  1. No evidence of pulmonary embolism.  2. Geographic consolidative opacities with mild volume loss and air  bronchograms are present in the right upper lobe, with several subtle  nodular opacities also present in the right lower lobe. Findings are  suggestive of pneumonia, although underlying mass is not entirely  excluded. Follow-up imaging after appropriate clinical intervention  is recommended.  3. Mildly enlarged, nonspecific, right hilar and mediastinal lymph  nodes may be reactive, although continued attention to on follow-up  is recommended.  4. 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      MACRO:  None    Signed by: Linwood Lopez 4/11/2024 11:13 PM  Dictation workstation:   [id number]       Assessment/Plan   Principal Problem:    Pneumonia of right lung due to infectious organism, unspecified part of lung    Diarrhea.    Recent syncopal episode   influenza A    Vitor Cooley MD  [facility]

## 2024-04-13 NOTE — PROGRESS NOTES
Pulmonary Progress Note 04/13/24     Patient seen in follow-up for pneumonia, shortness of breath    Subjective   Interval History:   Case signed out to me by Dr. Cooley.  Patient endorses headache and coughing.  Currently on room air.    Objective   Vital signs in last 24 hours:  Temp:  [36.6 °C (97.9 °F)-37 °C (98.6 °F)] 37 °C (98.6 °F)  Heart Rate:  [56-92] 92  Resp:  [16-17] 16  BP: (101-142)/(67-76) 126/72    Intake/Output last 3 shifts:  I/O last 3 completed shifts:  In: 4586.7 (64.2 mL/kg) [P.O.:1860; I.V.:1976.7 (27.7 mL/kg); IV Piggyback:750]  Out: 2600 (36.4 mL/kg) [Urine:2600 (1 mL/kg/hr)]  Weight: 71.4 kg   Intake/Output this shift:  I/O this shift:  In: 300 [P.O.:200; IV Piggyback:100]  Out: -     Physical Exam  Vitals reviewed.   Constitutional:       Appearance: Normal appearance.   HENT:      Head: Normocephalic and atraumatic.   Cardiovascular:      Rate and Rhythm: Normal rate and regular rhythm.   Pulmonary:      Effort: Pulmonary effort is normal.      Breath sounds: Normal breath sounds. No wheezing.   Musculoskeletal:      Right lower leg: No edema.      Left lower leg: No edema.   Skin:     General: Skin is warm and dry.   Neurological:      General: No focal deficit present.      Mental Status: He is alert.   Psychiatric:         Mood and Affect: Mood normal.         Behavior: Behavior normal.         Scheduled medications  enoxaparin, 40 mg, subcutaneous, Nightly  folic acid, 1 mg, oral, Daily  multivitamin with minerals, 1 tablet, oral, Daily  pantoprazole, 40 mg, oral, Daily before breakfast   Or  pantoprazole, 40 mg, intravenous, Daily before breakfast  piperacillin-tazobactam, 4.5 g, intravenous, q6h  polyethylene glycol, 17 g, oral, Daily  [START ON 4/15/2024] thiamine, 100 mg, oral, Daily  thiamine, 500 mg, intravenous, q8h DES      Continuous medications  sodium chloride 0.9%, 100 mL/hr, Last Rate: 100 mL/hr (04/13/24 0900)      PRN medications  PRN medications:  acetaminophen **OR** acetaminophen **OR** acetaminophen, diphenhydrAMINE, ipratropium-albuteroL, oxygen     Labs:  Lab Results   Component Value Date     04/13/2024    K 3.5 04/13/2024     04/13/2024    CO2 24 04/13/2024    BUN 10 04/13/2024    CREATININE 0.80 04/13/2024    GLUCOSE 88 04/13/2024    CALCIUM 8.5 04/13/2024     Lab Results   Component Value Date    WBC 6.5 04/13/2024    HGB 13.6 04/13/2024    HCT 39.4 (L) 04/13/2024    MCV 93 04/13/2024     04/13/2024       Imaging:  CT angio chest for pulmonary embolism    Result Date: 4/11/2024  Interpreted By:  Linwood Lopez, STUDY: CT ANGIO CHEST FOR PULMONARY EMBOLISM;  4/11/2024 10:45 pm   INDICATION: Signs/Symptoms:R CP/infiltrate/syncope.   COMPARISON: Radiographs of the chest dated 04/11/2024.   ACCESSION NUMBER(S): [id number]   ORDERING CLINICIAN: LAWRENCE NYE   TECHNIQUE: Helical data acquisition of the chest was obtained after intravenous administration of 75 mLof Omnipaque 350, as per PE protocol. Images were reformatted in coronal and sagittal planes. Axial and coronal maximum intensity projection (MIP) images were created and reviewed.   FINDINGS: POTENTIAL LIMITATIONS OF THE STUDY: None   HEART AND VESSELS: There are no discrete filling defects within main pulmonary artery and its branches to suggest acute pulmonary embolism. Main pulmonary artery and its branches are normal in caliber.   Ascending thoracic aorta is mildly ectatic, measuring up to 3.7 cm in diameter, and demonstrates mild vascular calcifications. Although, the study is not tailored for evaluation of aorta, there is no definite evidence of acute aortic pathology. Minimal coronary artery calcifications are seen. Please note, the study is not optimized for evaluation of coronary arteries.   The cardiac chambers are not enlarged.There are no findings to suggest right heart strain. There is no pericardial effusion seen.   MEDIASTINUM AND MARKUS, LOWER NECK AND  AXILLA: The visualized thyroid gland is within normal limits. Several mildly enlarged right hilar and mediastinal lymph nodes are present, measuring up to 1.4 cm in diameter. Esophagus appears within normal limits as seen.   LUNGS AND AIRWAYS: The trachea and central airways are patent. No endobronchial lesion is seen.   Geographic areas of consolidation with some volume loss and air bronchograms are present in the right upper lobe. Several additional subtle nodular opacities are present in the right middle lobe.   Trace right-sided pleural effusion is present.   No consolidation or pleural effusion is identified in the left lung.   UPPER ABDOMEN: The visualized subdiaphragmatic structures demonstrate no remarkable findings.   CHEST WALL AND OSSEOUS STRUCTURES: Chest wall is within normal limits. No acute osseous pathology.There are no suspicious osseous lesions.Mild multilevel degenerative changes are present in the thoracic spine.       1. No evidence of pulmonary embolism. 2. Geographic consolidative opacities with mild volume loss and air bronchograms are present in the right upper lobe, with several subtle nodular opacities also present in the right lower lobe. Findings are suggestive of pneumonia, although underlying mass is not entirely excluded. Follow-up imaging after appropriate clinical intervention is recommended. 3. Mildly enlarged, nonspecific, right hilar and mediastinal lymph nodes may be reactive, although continued attention to on follow-up is recommended. 4. Mild ectasia of the ascending thoracic aorta, measuring up to 3.7 cm in diameter.     MACRO: None   Signed by: Linwood Lopez 4/11/2024 11:13 PM Dictation workstation:   [id number]    XR chest 1 view    Result Date: 4/11/2024  Interpreted By:  Stacy Lazar, STUDY: XR CHEST 1 VIEW;  4/11/2024 7:55 pm   INDICATION: Signs/Symptoms:syncope.   COMPARISON: 07/13/2021   ACCESSION NUMBER(S): [id number]   ORDERING CLINICIAN: MONIK  PASSERALLO   FINDINGS: Hazy consolidation right mid lung zone suspicious for pneumonia in the appropriate clinical setting. Streaky bibasilar atelectasis. No pleural effusion or pneumothorax. Normal heart size. No acute osseous abnormality. ACDF hardware.       Hazy consolidation right mid lung zone suspicious for pneumonia in the appropriate clinical setting.   Signed by: Stacy Lazar 4/11/2024 8:27 PM Dictation workstation:   [id number]              Assessment/Plan   Principal Problem:    Pneumonia of right lung due to infectious organism, unspecified part of lung      Stable for discharge from our perspective.  We will sign off.  Please call with any questions     LOS: 1 day       Linda Lucas MD  Pulmonary/Critical Care medicine

## 2024-04-13 NOTE — CARE PLAN
The patient's goals for the shift include  rest    The clinical goals for the shift include monitor oxygen, IV antibiotics      Problem: Safety - Adult  Goal: Free from fall injury  Outcome: Progressing     Problem: Discharge Planning  Goal: Discharge to home or other facility with appropriate resources  Outcome: Progressing     Problem: Pain  Goal: Takes deep breaths with improved pain control throughout the shift  Outcome: Progressing  Goal: Turns in bed with improved pain control throughout the shift  Outcome: Progressing  Goal: Walks with improved pain control throughout the shift  Outcome: Progressing  Goal: Performs ADL's with improved pain control throughout shift  Outcome: Progressing  Goal: Participates in PT with improved pain control throughout the shift  Outcome: Progressing  Goal: Free from opioid side effects throughout the shift  Outcome: Progressing  Goal: Free from acute confusion related to pain meds throughout the shift  Outcome: Progressing

## 2024-04-13 NOTE — CARE PLAN
Problem: Safety - Adult  Goal: Free from fall injury  Outcome: Progressing  Flowsheets (Taken 4/13/2024 0407)  Free from fall injury:   Instruct family/caregiver on patient safety   Based on caregiver fall risk screen, instruct family/caregiver to ask for assistance with transferring infant if caregiver noted to have fall risk factors     Problem: Discharge Planning  Goal: Discharge to home or other facility with appropriate resources  Outcome: Progressing  Flowsheets (Taken 4/13/2024 0407)  Discharge to home or other facility with appropriate resources: Identify barriers to discharge with patient and caregiver     Problem: Pain  Goal: Takes deep breaths with improved pain control throughout the shift  Outcome: Progressing  Goal: Turns in bed with improved pain control throughout the shift  Outcome: Progressing  Goal: Walks with improved pain control throughout the shift  Outcome: Progressing  Goal: Performs ADL's with improved pain control throughout shift  Outcome: Progressing  Goal: Free from opioid side effects throughout the shift  Outcome: Progressing  Goal: Free from acute confusion related to pain meds throughout the shift  Outcome: Progressing   The patient's goals for the shift include  rest, feel better    The clinical goals for the shift include monitor oxygen, IV antibiotics      04/13/24 at 4:08 AM - Noemí Altamirano RN

## 2024-04-13 NOTE — DISCHARGE SUMMARY
Discharge Diagnosis  Pneumonia of right lung due to infectious organism, unspecified part of lung    Issues Requiring Follow-Up  Pneumonia    Discharge Meds     Your medication list        START taking these medications        Instructions Last Dose Given Next Dose Due   acetaminophen 325 mg tablet  Commonly known as: Tylenol      Take 2 tablets (650 mg) by mouth every 4 hours if needed for mild pain (1 - 3), headaches or fever (temp greater than 38.0 C).       azithromycin 500 mg tablet  Commonly known as: Zithromax      Take 1 tablet (500 mg) by mouth once daily for 7 days.       cefuroxime 500 mg tablet  Commonly known as: Ceftin      Take 1 tablet (500 mg) by mouth 2 times a day for 7 days.              STOP taking these medications      oseltamivir 75 mg capsule  Commonly known as: Tamiflu                  Where to Get Your Medications        These medications were sent to [facility]/pharmacy #[id number] - [facility] [address] [place] [id number] [place] [id number]      Phone: [phone]   azithromycin 500 mg tablet  cefuroxime 500 mg tablet       Information about where to get these medications is not yet available    Ask your nurse or doctor about these medications  acetaminophen 325 mg tablet         Test Results Pending At Discharge  Pending Labs       Order Current Status    Extra Urine Gray Tube Collected (04/11/24 1952)    OOB Internal Tracking In process    Opiate Confirmation, Urine In process    Stool Pathogen Panel, PCR In process    THC (Marijuana), Urine, Confirmation In process    Troponin T Series, High Sensitivity (0, 2HR, 6HR) In process    Urinalysis with Reflex Culture and Microscopic In process    Blood Culture Preliminary result    Blood Culture Preliminary result            Hospital Course   Admitted for pneumonia. Was treated with IV Abx. Evaluated by Pulmonology and ID. Has remained on room air entire admission. Medically stable for discharge. To complete oral  course of antibiotics on dc. Will need to follow-up with Pulmonology and will need repeat chest imaging.     Pertinent Physical Exam At Time of Discharge  Physical Exam  HENT:      Head: Normocephalic and atraumatic.      Nose: Nose normal.      Mouth/Throat:      Mouth: Mucous membranes are moist.      Pharynx: Oropharynx is clear.   Eyes:      Extraocular Movements: Extraocular movements intact.      Pupils: Pupils are equal, round, and reactive to light.   Cardiovascular:      Rate and Rhythm: Normal rate and regular rhythm.      Pulses: Normal pulses.   Pulmonary:      Effort: Pulmonary effort is normal.      Breath sounds: Normal breath sounds.   Abdominal:      General: Abdomen is flat. Bowel sounds are normal.      Palpations: Abdomen is soft.   Musculoskeletal:         General: Normal range of motion.   Skin:     General: Skin is warm and dry.      Capillary Refill: Capillary refill takes less than 2 seconds.   Neurological:      General: No focal deficit present.      Mental Status: He is oriented to person, place, and time.   Psychiatric:         Mood and Affect: Mood normal.         Outpatient Follow-Up  No future appointments.      Sangeeta Miles, APRN-CNP

## 2024-04-15 ENCOUNTER — DOCUMENTATION (OUTPATIENT)
Dept: PRIMARY CARE | Facility: CLINIC | Age: 69
End: 2024-04-15
Payer: MEDICARE

## 2024-04-15 LAB — CARBOXYTHC UR-MCNC: 149 NG/ML

## 2024-04-16 ENCOUNTER — PATIENT OUTREACH (OUTPATIENT)
Dept: PRIMARY CARE | Facility: CLINIC | Age: 69
End: 2024-04-16
Payer: MEDICARE

## 2024-04-16 LAB
BACTERIA BLD CULT: NORMAL
BACTERIA BLD CULT: NORMAL

## 2024-04-16 NOTE — PROGRESS NOTES
Discharge Facility: [facility]  Discharge Diagnosis: Pneumonia of right lung due to infectious organism  Admission Date: 4/12/24  Discharge Date: 4/13/24    PCP Appointment Date: no contact. Task to office  Specialist Appointment Date:  Hospital Encounter and Summary: Linked     George David LPN

## 2024-04-17 LAB
6MAM UR CFM-MCNC: <25 NG/ML
CODEINE UR CFM-MCNC: <50 NG/ML
HYDROCODONE CTO UR CFM-MCNC: <25 NG/ML
HYDROMORPHONE UR CFM-MCNC: <25 NG/ML
MORPHINE UR CFM-MCNC: >2500 NG/ML
NORHYDROCODONE UR CFM-MCNC: <25 NG/ML
NOROXYCODONE UR CFM-MCNC: <25 NG/ML
OXYCODONE UR CFM-MCNC: <25 NG/ML
OXYMORPHONE UR CFM-MCNC: <25 NG/ML

## 2024-04-18 ENCOUNTER — APPOINTMENT (OUTPATIENT)
Dept: RADIOLOGY | Facility: HOSPITAL | Age: 69
End: 2024-04-18
Payer: MEDICARE

## 2024-04-18 ENCOUNTER — HOSPITAL ENCOUNTER (EMERGENCY)
Facility: HOSPITAL | Age: 69
Discharge: HOME | End: 2024-04-18
Attending: EMERGENCY MEDICINE
Payer: MEDICARE

## 2024-04-18 ENCOUNTER — APPOINTMENT (OUTPATIENT)
Dept: CARDIOLOGY | Facility: HOSPITAL | Age: 69
End: 2024-04-18
Payer: MEDICARE

## 2024-04-18 VITALS
TEMPERATURE: 97.5 F | SYSTOLIC BLOOD PRESSURE: 136 MMHG | BODY MASS INDEX: 25.33 KG/M2 | HEART RATE: 75 BPM | OXYGEN SATURATION: 95 % | DIASTOLIC BLOOD PRESSURE: 89 MMHG | WEIGHT: 157.63 LBS | HEIGHT: 66 IN | RESPIRATION RATE: 17 BRPM

## 2024-04-18 DIAGNOSIS — I26.99 ACUTE PULMONARY EMBOLISM WITHOUT ACUTE COR PULMONALE, UNSPECIFIED PULMONARY EMBOLISM TYPE (MULTI): Primary | ICD-10-CM

## 2024-04-18 LAB
ALBUMIN SERPL-MCNC: 3.7 G/DL (ref 3.5–5)
ALP BLD-CCNC: 63 U/L (ref 35–125)
ALT SERPL-CCNC: 64 U/L (ref 5–40)
ANION GAP SERPL CALC-SCNC: 9 MMOL/L
APTT PPP: 26.7 SECONDS (ref 22–32.5)
AST SERPL-CCNC: 41 U/L (ref 5–40)
BASOPHILS # BLD AUTO: 0.07 X10*3/UL (ref 0–0.1)
BASOPHILS NFR BLD AUTO: 1.1 %
BILIRUB SERPL-MCNC: 0.2 MG/DL (ref 0.1–1.2)
BUN SERPL-MCNC: 7 MG/DL (ref 8–25)
CALCIUM SERPL-MCNC: 9.2 MG/DL (ref 8.5–10.4)
CHLORIDE SERPL-SCNC: 106 MMOL/L (ref 97–107)
CO2 SERPL-SCNC: 27 MMOL/L (ref 24–31)
CREAT SERPL-MCNC: 0.7 MG/DL (ref 0.4–1.6)
EGFRCR SERPLBLD CKD-EPI 2021: >90 ML/MIN/1.73M*2
EOSINOPHIL # BLD AUTO: 0.21 X10*3/UL (ref 0–0.7)
EOSINOPHIL NFR BLD AUTO: 3.4 %
ERYTHROCYTE [DISTWIDTH] IN BLOOD BY AUTOMATED COUNT: 13.4 % (ref 11.5–14.5)
GLUCOSE SERPL-MCNC: 92 MG/DL (ref 65–99)
HCT VFR BLD AUTO: 43.6 % (ref 41–52)
HGB BLD-MCNC: 14.6 G/DL (ref 13.5–17.5)
IMM GRANULOCYTES # BLD AUTO: 0.03 X10*3/UL (ref 0–0.7)
IMM GRANULOCYTES NFR BLD AUTO: 0.5 % (ref 0–0.9)
INR PPP: 1.1 (ref 0.9–1.2)
LYMPHOCYTES # BLD AUTO: 1.61 X10*3/UL (ref 1.2–4.8)
LYMPHOCYTES NFR BLD AUTO: 26.1 %
MCH RBC QN AUTO: 31.6 PG (ref 26–34)
MCHC RBC AUTO-ENTMCNC: 33.5 G/DL (ref 32–36)
MCV RBC AUTO: 94 FL (ref 80–100)
MONOCYTES # BLD AUTO: 0.41 X10*3/UL (ref 0.1–1)
MONOCYTES NFR BLD AUTO: 6.6 %
NEUTROPHILS # BLD AUTO: 3.85 X10*3/UL (ref 1.2–7.7)
NEUTROPHILS NFR BLD AUTO: 62.3 %
NRBC BLD-RTO: 0 /100 WBCS (ref 0–0)
NT-PROBNP SERPL-MCNC: 223 PG/ML (ref 0–229)
PLATELET # BLD AUTO: 410 X10*3/UL (ref 150–450)
POTASSIUM SERPL-SCNC: 4.1 MMOL/L (ref 3.4–5.1)
PROT SERPL-MCNC: 7.6 G/DL (ref 5.9–7.9)
PROTHROMBIN TIME: 10.9 SECONDS (ref 9.3–12.7)
RBC # BLD AUTO: 4.62 X10*6/UL (ref 4.5–5.9)
SODIUM SERPL-SCNC: 142 MMOL/L (ref 133–145)
TROPONIN T SERPL-MCNC: 8 NG/L
TROPONIN T SERPL-MCNC: 8 NG/L
WBC # BLD AUTO: 6.2 X10*3/UL (ref 4.4–11.3)

## 2024-04-18 PROCEDURE — 96375 TX/PRO/DX INJ NEW DRUG ADDON: CPT

## 2024-04-18 PROCEDURE — 94640 AIRWAY INHALATION TREATMENT: CPT

## 2024-04-18 PROCEDURE — 96374 THER/PROPH/DIAG INJ IV PUSH: CPT

## 2024-04-18 PROCEDURE — 2500000002 HC RX 250 W HCPCS SELF ADMINISTERED DRUGS (ALT 637 FOR MEDICARE OP, ALT 636 FOR OP/ED): Mod: MUE | Performed by: EMERGENCY MEDICINE

## 2024-04-18 PROCEDURE — 2500000004 HC RX 250 GENERAL PHARMACY W/ HCPCS (ALT 636 FOR OP/ED): Performed by: EMERGENCY MEDICINE

## 2024-04-18 PROCEDURE — 71275 CT ANGIOGRAPHY CHEST: CPT | Performed by: RADIOLOGY

## 2024-04-18 PROCEDURE — 2550000001 HC RX 255 CONTRASTS: Performed by: EMERGENCY MEDICINE

## 2024-04-18 PROCEDURE — 36415 COLL VENOUS BLD VENIPUNCTURE: CPT | Performed by: EMERGENCY MEDICINE

## 2024-04-18 PROCEDURE — 83880 ASSAY OF NATRIURETIC PEPTIDE: CPT | Performed by: EMERGENCY MEDICINE

## 2024-04-18 PROCEDURE — 99285 EMERGENCY DEPT VISIT HI MDM: CPT | Mod: 25

## 2024-04-18 PROCEDURE — 2500000002 HC RX 250 W HCPCS SELF ADMINISTERED DRUGS (ALT 637 FOR MEDICARE OP, ALT 636 FOR OP/ED): Performed by: EMERGENCY MEDICINE

## 2024-04-18 PROCEDURE — 93005 ELECTROCARDIOGRAM TRACING: CPT

## 2024-04-18 PROCEDURE — 85025 COMPLETE CBC W/AUTO DIFF WBC: CPT | Performed by: EMERGENCY MEDICINE

## 2024-04-18 PROCEDURE — 96361 HYDRATE IV INFUSION ADD-ON: CPT

## 2024-04-18 PROCEDURE — 2500000001 HC RX 250 WO HCPCS SELF ADMINISTERED DRUGS (ALT 637 FOR MEDICARE OP): Performed by: EMERGENCY MEDICINE

## 2024-04-18 PROCEDURE — 85610 PROTHROMBIN TIME: CPT | Performed by: EMERGENCY MEDICINE

## 2024-04-18 PROCEDURE — 85730 THROMBOPLASTIN TIME PARTIAL: CPT | Performed by: EMERGENCY MEDICINE

## 2024-04-18 PROCEDURE — 84484 ASSAY OF TROPONIN QUANT: CPT | Performed by: EMERGENCY MEDICINE

## 2024-04-18 PROCEDURE — 84075 ASSAY ALKALINE PHOSPHATASE: CPT | Performed by: EMERGENCY MEDICINE

## 2024-04-18 PROCEDURE — 71275 CT ANGIOGRAPHY CHEST: CPT

## 2024-04-18 PROCEDURE — 71045 X-RAY EXAM CHEST 1 VIEW: CPT

## 2024-04-18 PROCEDURE — 71045 X-RAY EXAM CHEST 1 VIEW: CPT | Performed by: RADIOLOGY

## 2024-04-18 RX ORDER — ONDANSETRON HYDROCHLORIDE 2 MG/ML
4 INJECTION, SOLUTION INTRAVENOUS ONCE
Status: COMPLETED | OUTPATIENT
Start: 2024-04-18 | End: 2024-04-18

## 2024-04-18 RX ORDER — IPRATROPIUM BROMIDE AND ALBUTEROL SULFATE 2.5; .5 MG/3ML; MG/3ML
3 SOLUTION RESPIRATORY (INHALATION) ONCE
Status: COMPLETED | OUTPATIENT
Start: 2024-04-18 | End: 2024-04-18

## 2024-04-18 RX ORDER — MORPHINE SULFATE 4 MG/ML
4 INJECTION, SOLUTION INTRAMUSCULAR; INTRAVENOUS ONCE
Status: COMPLETED | OUTPATIENT
Start: 2024-04-18 | End: 2024-04-18

## 2024-04-18 RX ORDER — TRAMADOL HYDROCHLORIDE 50 MG/1
50 TABLET ORAL EVERY 8 HOURS PRN
Qty: 9 TABLET | Refills: 0 | Status: SHIPPED | OUTPATIENT
Start: 2024-04-18 | End: 2024-04-21

## 2024-04-18 RX ORDER — KETOROLAC TROMETHAMINE 30 MG/ML
15 INJECTION, SOLUTION INTRAMUSCULAR; INTRAVENOUS ONCE
Status: COMPLETED | OUTPATIENT
Start: 2024-04-18 | End: 2024-04-18

## 2024-04-18 RX ADMIN — IPRATROPIUM BROMIDE AND ALBUTEROL SULFATE 3 ML: 2.5; .5 SOLUTION RESPIRATORY (INHALATION) at 07:26

## 2024-04-18 RX ADMIN — IOHEXOL 75 ML: 350 INJECTION, SOLUTION INTRAVENOUS at 08:51

## 2024-04-18 RX ADMIN — METHYLPREDNISOLONE SODIUM SUCCINATE 125 MG: 125 INJECTION, POWDER, FOR SOLUTION INTRAMUSCULAR; INTRAVENOUS at 07:24

## 2024-04-18 RX ADMIN — KETOROLAC TROMETHAMINE 15 MG: 30 INJECTION, SOLUTION INTRAMUSCULAR at 07:24

## 2024-04-18 RX ADMIN — MORPHINE SULFATE 4 MG: 4 INJECTION, SOLUTION INTRAMUSCULAR; INTRAVENOUS at 07:24

## 2024-04-18 RX ADMIN — APIXABAN 10 MG: 5 TABLET, FILM COATED ORAL at 10:01

## 2024-04-18 RX ADMIN — SODIUM CHLORIDE 1000 ML: 9 INJECTION, SOLUTION INTRAVENOUS at 07:24

## 2024-04-18 RX ADMIN — ONDANSETRON 4 MG: 2 INJECTION INTRAMUSCULAR; INTRAVENOUS at 07:24

## 2024-04-18 ASSESSMENT — PAIN - FUNCTIONAL ASSESSMENT: PAIN_FUNCTIONAL_ASSESSMENT: 0-10

## 2024-04-18 ASSESSMENT — COLUMBIA-SUICIDE SEVERITY RATING SCALE - C-SSRS
6. HAVE YOU EVER DONE ANYTHING, STARTED TO DO ANYTHING, OR PREPARED TO DO ANYTHING TO END YOUR LIFE?: NO
2. HAVE YOU ACTUALLY HAD ANY THOUGHTS OF KILLING YOURSELF?: NO
1. IN THE PAST MONTH, HAVE YOU WISHED YOU WERE DEAD OR WISHED YOU COULD GO TO SLEEP AND NOT WAKE UP?: NO

## 2024-04-18 ASSESSMENT — PAIN SCALES - GENERAL
PAINLEVEL_OUTOF10: 8
PAINLEVEL_OUTOF10: 7

## 2024-04-18 NOTE — DISCHARGE INSTRUCTIONS
You Have been diagnosed with a blood clot in your lung.  The treatment for this is to take blood thinner so it is very important you take this as prescribed until gone and follow-up closely with your primary care physician or lung physician.  You may take over-the-counter medication as needed for pain, prescribed medication as needed for moderate to severe pain.  Return immediately if you develop worsening shortness of breath.    Blood thinner does increase your risk of bleeding therefore if you sustain a head injury or have significant bleeding you should return to the emergency department.

## 2024-04-18 NOTE — ED PROVIDER NOTES
HPI   Chief Complaint   Patient presents with    Chest Pain     Pt was diagnosed with pneumonia last week. Pt states he is having right sided chest pain that is worse than last week.        68-year-old male presents for chief complaint of pleuritic right-sided chest pain.  He was hospitalized last week for pneumonia discharged on Saturday, 4/13/2024 states he was doing better and taking his antibiotics including azithromycin and cefuroxime and has had worsening pleuritic right-sided chest pain which he did have previously when diagnosed with the right lung pneumonia.  EMS assisted with history reported patient was 91% on room air, no medications administered.  Denies history of COPD or chronic lung disease former smoker quit about 10 years ago.      History provided by:  EMS personnel, medical records and patient                      [place] Coma Scale Score: 15                     Patient History   History reviewed. No pertinent past medical history.  History reviewed. No pertinent surgical history.  No family history on file.  Social History     Tobacco Use    Smoking status: Former     Types: Cigarettes     Passive exposure: Past    Smokeless tobacco: Never   Substance Use Topics    Alcohol use: Not on file    Drug use: Not on file       Physical Exam   ED Triage Vitals   Temp Pulse Resp BP   -- -- -- --      SpO2 Temp src Heart Rate Source Patient Position   -- -- -- --      BP Location FiO2 (%)     -- --       Physical Exam  Vitals and nursing note reviewed.     General: Vitals reviewed. Awake, alert, well-developed, well-nourished, appears uncomfortable secondary to pain, splinting respirations  HEENT: NC/AT, PERRL, MMM  Neck: Supple, trachea midline  Respiratory: No respiratory distress, but is splinting respirations secondary to pain taking short and shallow breaths, lungs with good air entry, scattered end expiratory wheezes, rhonchi, or rales  CV: Regular rate and regular rhythm, no  murmur/gallop/rubs  Abdomen/GI: Soft, non-tender, non-distended, no rebound, guarding, or rigidity, normal bowel sounds  Extremities: Moving all extremities, no deformities, no peripheral edema, calf tenderness or palpable cords  Neuro: A/O, normal speech  Skin: Warm, dry. No rashes identified    ED Course & MDM   ED Course as of 04/18/24 1328   Thu Apr 18, 2024   0720 EKG on my independent interpretation: Normal sinus rhythm 67 bpm, normal axis, normal intervals, nonspecific T wave flattening which is diffuse and new compared to prior EKG 9/4/2001 no other acute ST or T wave abnormalities [CASANDRA]      ED Course User Index  [CASANDRA] Daisy Domingo MD         Diagnoses as of 04/18/24 1328   Acute pulmonary embolism without acute cor pulmonale, unspecified pulmonary embolism type (Multi)       Medical Decision Making  68-year-old male presents for pleuritic right-sided chest pain recently hospitalized for right-sided pneumonia symptoms now worsening.  Compliant with outpatient antibiotics.  Denies history of chronic lung disease.  He is hemodynamically stable saturating 94% on room air but splinting respirations appears uncomfortable.  Consider pneumothorax, pleurisy, PE given his recent hospitalization although no evidence of DVT no other significant risk factors.  Low suspicion for ACS however will do cardiac workup as well.  EKG nondiagnostic.  Labs obtained without significant abnormality.  Troponins negative x 2.  CT angio chest shows patchy pneumonia with less consolidation and better aeration compared to prior new pulmonary embolus in the right lower lobe segmental and subsegmental area.  There is no evidence of right heart strain.  Patient is saturating well on room air, not tachycardic and pain controlled with treatment here in the emergency department.  I did utilize evidence-based decision-making tool regarding discharging patient with new pulmonary embolism he does not have any high risk features to suggest  strongly that he needs hospitalization.  Given first dose of oral Eliquis.  Given risk of outpatient management is less than or equal to risk of hospitalization at this time do feel patient is appropriate candidate for outpatient management.  Written prescription for Eliquis as well as short course of pain control.  Referred to pulmonology as well as his PCP for follow-up.  Return precautions discussed.        Amount and/or Complexity of Data Reviewed  Independent Historian: EMS  External Data Reviewed: notes.     Details: Discharge summary reviewed from internal medicine from hospitalization 4/11/2024 through 4/13/2024  Labs: ordered. Decision-making details documented in ED Course.  Radiology: ordered and independent interpretation performed. Decision-making details documented in ED Course.  ECG/medicine tests: ordered and independent interpretation performed. Decision-making details documented in ED Course.        Procedure  Procedures     Daisy Domingo MD  04/18/24 [id number]

## 2024-04-19 LAB
ATRIAL RATE: 67 BPM
P AXIS: 40 DEGREES
P OFFSET: 198 MS
P ONSET: 158 MS
PR INTERVAL: 138 MS
Q ONSET: 227 MS
QRS COUNT: 11 BEATS
QRS DURATION: 80 MS
QT INTERVAL: 420 MS
QTC CALCULATION(BAZETT): 443 MS
QTC FREDERICIA: 436 MS
R AXIS: 2 DEGREES
T AXIS: 25 DEGREES
T OFFSET: 437 MS
VENTRICULAR RATE: 67 BPM

## 2024-04-23 NOTE — DOCUMENTATION CLARIFICATION NOTE
PATIENT:               CHIQUITA NGO  ACCT #:                  [id number]  MRN:                       [id number]  :                       1955  ADMIT DATE:       2024 7:55 PM  DISCH DATE:        2024 11:57 AM  RESPONDING PROVIDER #:        [id number]          PROVIDER RESPONSE TEXT:    Pt had no issues with hyponatremia. This was isolated incident.    CDI QUERY TEXT:    Clarification      Instruction:    Based on your assessment of the patient and the clinical information, please provide the requested documentation by clicking on the appropriate radio button and enter any additional information if prompted.    Question: Is there a diagnosis indicative of the lab values or image study    When answering this query, please exercise your independent professional judgment. The fact that a question is being asked, does not imply that any particular answer is desired or expected.    The patient's clinical indicators include:  Clinical Information:  This is a 68 y.o. male admitted from ED for right-sided CP, SOB, n/v/d and fever.  Pt has had syncopal episodes.  Tested + for Influenza A at his PCP but negative upon admission.  Pt treated for PNA, dehydration, and on Tamiflu for Influenza.    Clinical Indicators:    :      Treatment:  NS 1L IV bolus x2 (), Monitoring Na level daily via labs.    Risk Factors:  Alcohol use, Dehydration, Influenza, Tamiflu medication  Options provided:  -- Hyponatremia is clinically significant and required treatment/monitoring  -- Other - I will add my own diagnosis  -- Refer to Clinical Documentation Reviewer    Query created by: Sid Regan on 4/15/2024 4:14 PM      Electronically signed by:  HEIDI MAURICIO-CNP 2024 1:48 PM

## 2024-04-29 ASSESSMENT — ENCOUNTER SYMPTOMS
DIFFICULTY URINATING: 0
DIARRHEA: 0
NAUSEA: 0
CHILLS: 1
ENDOCRINE NEGATIVE: 1
SHORTNESS OF BREATH: 0
COUGH: 1
DIZZINESS: 0
FATIGUE: 1
FEVER: 0

## 2024-04-29 NOTE — PROGRESS NOTES
Subjective   Patient ID: Arian Iglesias is a 68 y.o. male who presents for No chief complaint on file..    Telemedicine visit audio only  Flulike symptoms for approximately 3 to 4 days       Review of Systems   Constitutional:  Positive for chills and fatigue. Negative for fever.        Also see HPI   Eyes:  Negative for visual disturbance.   Respiratory:  Positive for cough. Negative for shortness of breath.    Cardiovascular:  Negative for chest pain.   Gastrointestinal:  Negative for diarrhea and nausea.   Endocrine: Negative.    Genitourinary:  Negative for difficulty urinating.   Skin:  Negative for rash.   Neurological:  Negative for dizziness.        No focal deficits   Psychiatric/Behavioral:  Negative for suicidal ideas.    All other systems reviewed and are negative.      Objective   Pulse (!) 112   Temp 36.9 °C (98.4 °F)   SpO2 95%     Physical Exam  Neurological:      Mental Status: He is alert and oriented to person, place, and time.   Psychiatric:         Mood and Affect: Mood normal.       Assessment/Plan   Diagnoses and all orders for this visit:  Flu-like symptoms  -     Influenza A, and B PCR  Influenza A

## 2024-05-01 ENCOUNTER — PATIENT OUTREACH (OUTPATIENT)
Dept: PRIMARY CARE | Facility: CLINIC | Age: 69
End: 2024-05-01
Payer: MEDICARE

## 2024-05-01 NOTE — PROGRESS NOTES
Unable to reach patient for call back after patient's follow up appointment with PCP.   LVM with call back number for patient to call if needed   If no voicemail available call attempts x 2 were made to contact the patient to assist with any questions or concerns patient may have.     George David LPN

## 2024-05-03 ENCOUNTER — OFFICE VISIT (OUTPATIENT)
Dept: PRIMARY CARE | Facility: CLINIC | Age: 69
End: 2024-05-03
Payer: MEDICARE

## 2024-05-03 VITALS
WEIGHT: 166 LBS | OXYGEN SATURATION: 95 % | HEART RATE: 68 BPM | DIASTOLIC BLOOD PRESSURE: 62 MMHG | TEMPERATURE: 98.2 F | BODY MASS INDEX: 28.34 KG/M2 | HEIGHT: 64 IN | SYSTOLIC BLOOD PRESSURE: 110 MMHG

## 2024-05-03 DIAGNOSIS — R06.02 SHORTNESS OF BREATH: ICD-10-CM

## 2024-05-03 DIAGNOSIS — I26.99 ACUTE PULMONARY EMBOLISM WITHOUT ACUTE COR PULMONALE, UNSPECIFIED PULMONARY EMBOLISM TYPE (MULTI): Primary | ICD-10-CM

## 2024-05-03 PROCEDURE — 99213 OFFICE O/P EST LOW 20 MIN: CPT | Performed by: FAMILY MEDICINE

## 2024-05-03 PROCEDURE — 1159F MED LIST DOCD IN RCRD: CPT | Performed by: FAMILY MEDICINE

## 2024-05-03 PROCEDURE — 1126F AMNT PAIN NOTED NONE PRSNT: CPT | Performed by: FAMILY MEDICINE

## 2024-05-03 PROCEDURE — 1111F DSCHRG MED/CURRENT MED MERGE: CPT | Performed by: FAMILY MEDICINE

## 2024-05-03 RX ORDER — LANOLIN ALCOHOL/MO/W.PET/CERES
100 CREAM (GRAM) TOPICAL DAILY
COMMUNITY
Start: 2024-04-15

## 2024-05-03 RX ORDER — POLYETHYLENE GLYCOL 3350 17 G/17G
17 POWDER, FOR SOLUTION ORAL DAILY
COMMUNITY
Start: 2024-04-12

## 2024-05-03 RX ORDER — ALBUTEROL SULFATE 90 UG/1
2 AEROSOL, METERED RESPIRATORY (INHALATION) EVERY 4 HOURS PRN
Qty: 18 G | Refills: 3 | Status: SHIPPED | OUTPATIENT
Start: 2024-05-03

## 2024-05-03 RX ORDER — IPRATROPIUM BROMIDE AND ALBUTEROL SULFATE 2.5; .5 MG/3ML; MG/3ML
3 SOLUTION RESPIRATORY (INHALATION)
COMMUNITY
Start: 2024-04-12

## 2024-05-03 RX ORDER — FOLIC ACID 1 MG/1
1 TABLET ORAL DAILY
COMMUNITY
Start: 2024-04-12

## 2024-05-03 RX ORDER — DIPHENHYDRAMINE HCL 25 MG
25 TABLET ORAL EVERY 6 HOURS PRN
COMMUNITY
Start: 2024-04-12

## 2024-05-03 ASSESSMENT — ENCOUNTER SYMPTOMS
LOSS OF SENSATION IN FEET: 0
DEPRESSION: 0
OCCASIONAL FEELINGS OF UNSTEADINESS: 0

## 2024-05-03 ASSESSMENT — PAIN SCALES - GENERAL: PAINLEVEL: 0-NO PAIN

## 2024-05-03 NOTE — PROGRESS NOTES
"Subjective   Patient ID: Arian Iglesias is a 68 y.o. male who presents for hosp discharge.    HPI     Review of Systems    Objective   /62   Pulse 68   Temp 36.8 °C (98.2 °F)   Ht 1.626 m (5' 4\")   Wt 75.3 kg (166 lb)   SpO2 95%   BMI 28.49 kg/m²     Physical Exam    Assessment/Plan   {Assess/PlanSmartLinks:80528}       "

## 2024-05-14 ENCOUNTER — PATIENT OUTREACH (OUTPATIENT)
Dept: PRIMARY CARE | Facility: CLINIC | Age: 69
End: 2024-05-14
Payer: MEDICARE

## 2024-05-14 NOTE — PROGRESS NOTES
Unable to reach patient for discharge follow up call.   LVM with call back number for patient to call if needed   If no voicemail available call attempts x 2 were made to contact the patient to assist with any questions or concerns patient may have.    George David LPN

## 2024-06-05 ENCOUNTER — HOSPITAL ENCOUNTER (OUTPATIENT)
Dept: RADIOLOGY | Facility: HOSPITAL | Age: 69
Discharge: HOME | End: 2024-06-05
Payer: MEDICARE

## 2024-06-05 DIAGNOSIS — J18.9 PNEUMONIA, UNSPECIFIED ORGANISM: ICD-10-CM

## 2024-06-05 PROCEDURE — 71250 CT THORAX DX C-: CPT | Performed by: STUDENT IN AN ORGANIZED HEALTH CARE EDUCATION/TRAINING PROGRAM

## 2024-06-05 PROCEDURE — 71250 CT THORAX DX C-: CPT

## 2024-06-19 LAB
ATRIAL RATE: 91 BPM
P OFFSET: 201 MS
P ONSET: 155 MS
PR INTERVAL: 140 MS
Q ONSET: 225 MS
QRS COUNT: 15 BEATS
QRS DURATION: 84 MS
QT INTERVAL: 348 MS
QTC CALCULATION(BAZETT): 428 MS
QTC FREDERICIA: 400 MS
R AXIS: 196 DEGREES
T AXIS: 152 DEGREES
T OFFSET: 399 MS
VENTRICULAR RATE: 91 BPM

## 2024-06-19 NOTE — PROGRESS NOTES
"Subjective   Patient ID: Arian Iglesias is a 68 y.o. male who presents for hosp discharge.    Follow up hospital discharge-pneumonia  Pulmonary embolism  SOB             Review of Systems   Constitutional:  Negative for fever.        Also see HPI   Eyes:  Negative for visual disturbance.   Respiratory:  Positive for shortness of breath.    Cardiovascular:  Negative for chest pain.   Gastrointestinal:  Negative for diarrhea and nausea.   Endocrine: Negative.    Genitourinary:  Negative for difficulty urinating.   Skin:  Negative for rash.   Neurological:  Negative for dizziness.        No focal deficits   Psychiatric/Behavioral:  Negative for suicidal ideas.    All other systems reviewed and are negative.      Objective   /62   Pulse 68   Temp 36.8 °C (98.2 °F)   Ht 1.626 m (5' 4\")   Wt 75.3 kg (166 lb)   SpO2 95%   BMI 28.49 kg/m²     Physical Exam  Vitals and nursing note reviewed.   Constitutional:       Appearance: Normal appearance.   HENT:      Head: Normocephalic and atraumatic.   Eyes:      Extraocular Movements: Extraocular movements intact.      Conjunctiva/sclera: Conjunctivae normal.   Cardiovascular:      Rate and Rhythm: Normal rate and regular rhythm.      Heart sounds: Normal heart sounds.   Pulmonary:      Effort: Pulmonary effort is normal.      Breath sounds: Normal breath sounds.      Comments: Lungs essentially CTA b/l  Abdominal:      General: There is no distension.      Palpations: Abdomen is soft. There is no mass.      Tenderness: There is no abdominal tenderness.   Musculoskeletal:      Right lower leg: No edema.      Left lower leg: No edema.   Skin:     Coloration: Skin is not jaundiced.      Findings: No rash.   Neurological:      General: No focal deficit present.      Mental Status: He is alert and oriented to person, place, and time.   Psychiatric:         Mood and Affect: Mood normal.         Behavior: Behavior normal.         Thought Content: Thought content normal.    "      Judgment: Judgment normal.         Assessment/Plan   Diagnoses and all orders for this visit:  Acute pulmonary embolism without acute cor pulmonale, unspecified pulmonary embolism type (Multi)  Shortness of breath  -     albuterol (Ventolin HFA) 90 mcg/actuation inhaler; Inhale 2 puffs every 4 hours if needed for wheezing or shortness of breath.

## 2024-06-23 ASSESSMENT — ENCOUNTER SYMPTOMS
FEVER: 0
SHORTNESS OF BREATH: 1
DIARRHEA: 0
DIZZINESS: 0
NAUSEA: 0
ENDOCRINE NEGATIVE: 1
DIFFICULTY URINATING: 0

## 2024-10-11 ENCOUNTER — APPOINTMENT (OUTPATIENT)
Dept: PRIMARY CARE | Facility: CLINIC | Age: 69
End: 2024-10-11
Payer: MEDICARE

## 2024-11-04 ENCOUNTER — OFFICE VISIT (OUTPATIENT)
Dept: PRIMARY CARE | Facility: CLINIC | Age: 69
End: 2024-11-04
Payer: MEDICARE

## 2024-11-04 VITALS
OXYGEN SATURATION: 95 % | BODY MASS INDEX: 27.96 KG/M2 | DIASTOLIC BLOOD PRESSURE: 70 MMHG | WEIGHT: 163.8 LBS | HEART RATE: 60 BPM | SYSTOLIC BLOOD PRESSURE: 150 MMHG | HEIGHT: 64 IN | TEMPERATURE: 98.2 F

## 2024-11-04 DIAGNOSIS — K92.1 HEMATOCHEZIA: ICD-10-CM

## 2024-11-04 DIAGNOSIS — Z82.49 FAMILY HISTORY OF CORONARY ARTERY DISEASE: Primary | ICD-10-CM

## 2024-11-04 DIAGNOSIS — R93.89 ABNORMAL IMAGING OF THYROID: ICD-10-CM

## 2024-11-04 PROCEDURE — 99214 OFFICE O/P EST MOD 30 MIN: CPT | Performed by: FAMILY MEDICINE

## 2024-11-04 PROCEDURE — 1125F AMNT PAIN NOTED PAIN PRSNT: CPT | Performed by: FAMILY MEDICINE

## 2024-11-04 PROCEDURE — 3008F BODY MASS INDEX DOCD: CPT | Performed by: FAMILY MEDICINE

## 2024-11-04 PROCEDURE — 1159F MED LIST DOCD IN RCRD: CPT | Performed by: FAMILY MEDICINE

## 2024-11-04 ASSESSMENT — ENCOUNTER SYMPTOMS
DEPRESSION: 0
OCCASIONAL FEELINGS OF UNSTEADINESS: 0
LOSS OF SENSATION IN FEET: 0

## 2024-11-04 ASSESSMENT — PATIENT HEALTH QUESTIONNAIRE - PHQ9
SUM OF ALL RESPONSES TO PHQ9 QUESTIONS 1 AND 2: 0
2. FEELING DOWN, DEPRESSED OR HOPELESS: NOT AT ALL
1. LITTLE INTEREST OR PLEASURE IN DOING THINGS: NOT AT ALL

## 2024-11-04 ASSESSMENT — PAIN SCALES - GENERAL: PAINLEVEL_OUTOF10: 7

## 2024-11-06 DIAGNOSIS — R94.5 ABNORMAL RESULTS OF LIVER FUNCTION STUDIES: ICD-10-CM

## 2024-11-06 DIAGNOSIS — K92.1 MELENA: Primary | ICD-10-CM

## 2024-11-06 DIAGNOSIS — K76.0 FATTY (CHANGE OF) LIVER, NOT ELSEWHERE CLASSIFIED: ICD-10-CM

## 2024-11-09 ENCOUNTER — LAB (OUTPATIENT)
Dept: LAB | Facility: LAB | Age: 69
End: 2024-11-09
Payer: MEDICARE

## 2024-11-09 DIAGNOSIS — K92.1 MELENA: ICD-10-CM

## 2024-11-09 DIAGNOSIS — K76.0 FATTY (CHANGE OF) LIVER, NOT ELSEWHERE CLASSIFIED: ICD-10-CM

## 2024-11-09 DIAGNOSIS — R94.5 ABNORMAL RESULTS OF LIVER FUNCTION STUDIES: ICD-10-CM

## 2024-11-09 LAB
ALBUMIN SERPL BCP-MCNC: 4.3 G/DL (ref 3.4–5)
ALP SERPL-CCNC: 50 U/L (ref 33–136)
ALT SERPL W P-5'-P-CCNC: 63 U/L (ref 10–52)
ANION GAP SERPL CALC-SCNC: 15 MMOL/L (ref 10–20)
AST SERPL W P-5'-P-CCNC: 47 U/L (ref 9–39)
BILIRUB SERPL-MCNC: 0.6 MG/DL (ref 0–1.2)
BUN SERPL-MCNC: 11 MG/DL (ref 6–23)
CALCIUM SERPL-MCNC: 9.2 MG/DL (ref 8.6–10.3)
CHLORIDE SERPL-SCNC: 102 MMOL/L (ref 98–107)
CO2 SERPL-SCNC: 29 MMOL/L (ref 21–32)
CREAT SERPL-MCNC: 0.86 MG/DL (ref 0.5–1.3)
EGFRCR SERPLBLD CKD-EPI 2021: >90 ML/MIN/1.73M*2
FERRITIN SERPL-MCNC: 396 NG/ML (ref 20–300)
GLUCOSE SERPL-MCNC: 92 MG/DL (ref 74–99)
HAV AB SER QL IA: REACTIVE
HAV IGM SER QL: NONREACTIVE
HBV CORE AB SER QL: NONREACTIVE
HBV SURFACE AB SER-ACNC: <3.1 MIU/ML
HBV SURFACE AG SERPL QL IA: NONREACTIVE
HCV AB SER QL: NONREACTIVE
IRON SATN MFR SERPL: 32 % (ref 25–45)
IRON SERPL-MCNC: 116 UG/DL (ref 35–150)
POTASSIUM SERPL-SCNC: 4.5 MMOL/L (ref 3.5–5.3)
PROT SERPL-MCNC: 7 G/DL (ref 6.4–8.2)
SODIUM SERPL-SCNC: 141 MMOL/L (ref 136–145)
TIBC SERPL-MCNC: 367 UG/DL (ref 240–445)
UIBC SERPL-MCNC: 251 UG/DL (ref 110–370)

## 2024-11-09 PROCEDURE — 36415 COLL VENOUS BLD VENIPUNCTURE: CPT

## 2024-11-09 PROCEDURE — 83550 IRON BINDING TEST: CPT

## 2024-11-09 PROCEDURE — 80053 COMPREHEN METABOLIC PANEL: CPT

## 2024-11-09 PROCEDURE — 82728 ASSAY OF FERRITIN: CPT

## 2024-11-09 PROCEDURE — 83540 ASSAY OF IRON: CPT

## 2024-11-09 PROCEDURE — 86704 HEP B CORE ANTIBODY TOTAL: CPT

## 2024-11-09 PROCEDURE — 86708 HEPATITIS A ANTIBODY: CPT

## 2024-11-09 PROCEDURE — 87340 HEPATITIS B SURFACE AG IA: CPT

## 2024-11-09 PROCEDURE — 86803 HEPATITIS C AB TEST: CPT

## 2024-11-09 PROCEDURE — 86706 HEP B SURFACE ANTIBODY: CPT

## 2024-11-09 PROCEDURE — 86709 HEPATITIS A IGM ANTIBODY: CPT

## 2024-11-13 DIAGNOSIS — K92.1 MELENA: Primary | ICD-10-CM

## 2024-11-14 ENCOUNTER — HOSPITAL ENCOUNTER (OUTPATIENT)
Dept: RADIOLOGY | Facility: HOSPITAL | Age: 69
Discharge: HOME | End: 2024-11-14
Payer: MEDICARE

## 2024-11-14 ENCOUNTER — LAB (OUTPATIENT)
Dept: LAB | Facility: LAB | Age: 69
End: 2024-11-14
Payer: MEDICARE

## 2024-11-14 DIAGNOSIS — R93.89 ABNORMAL IMAGING OF THYROID: ICD-10-CM

## 2024-11-14 DIAGNOSIS — K92.1 MELENA: ICD-10-CM

## 2024-11-14 LAB
BASOPHILS # BLD AUTO: 0.07 X10*3/UL (ref 0–0.1)
BASOPHILS NFR BLD AUTO: 1.2 %
EOSINOPHIL # BLD AUTO: 0.12 X10*3/UL (ref 0–0.7)
EOSINOPHIL NFR BLD AUTO: 2.1 %
ERYTHROCYTE [DISTWIDTH] IN BLOOD BY AUTOMATED COUNT: 12.9 % (ref 11.5–14.5)
HCT VFR BLD AUTO: 43.9 % (ref 41–52)
HGB BLD-MCNC: 15.2 G/DL (ref 13.5–17.5)
IMM GRANULOCYTES # BLD AUTO: 0.01 X10*3/UL (ref 0–0.7)
IMM GRANULOCYTES NFR BLD AUTO: 0.2 % (ref 0–0.9)
LYMPHOCYTES # BLD AUTO: 1.85 X10*3/UL (ref 1.2–4.8)
LYMPHOCYTES NFR BLD AUTO: 32.3 %
MCH RBC QN AUTO: 32.5 PG (ref 26–34)
MCHC RBC AUTO-ENTMCNC: 34.6 G/DL (ref 32–36)
MCV RBC AUTO: 94 FL (ref 80–100)
MONOCYTES # BLD AUTO: 0.57 X10*3/UL (ref 0.1–1)
MONOCYTES NFR BLD AUTO: 10 %
NEUTROPHILS # BLD AUTO: 3.1 X10*3/UL (ref 1.2–7.7)
NEUTROPHILS NFR BLD AUTO: 54.2 %
NRBC BLD-RTO: 0 /100 WBCS (ref 0–0)
PLATELET # BLD AUTO: 223 X10*3/UL (ref 150–450)
RBC # BLD AUTO: 4.67 X10*6/UL (ref 4.5–5.9)
WBC # BLD AUTO: 5.7 X10*3/UL (ref 4.4–11.3)

## 2024-11-14 PROCEDURE — 76536 US EXAM OF HEAD AND NECK: CPT | Performed by: STUDENT IN AN ORGANIZED HEALTH CARE EDUCATION/TRAINING PROGRAM

## 2024-11-14 PROCEDURE — 36415 COLL VENOUS BLD VENIPUNCTURE: CPT

## 2024-11-14 PROCEDURE — 85025 COMPLETE CBC W/AUTO DIFF WBC: CPT

## 2024-11-14 PROCEDURE — 76536 US EXAM OF HEAD AND NECK: CPT

## 2024-11-16 ENCOUNTER — HOSPITAL ENCOUNTER (EMERGENCY)
Facility: HOSPITAL | Age: 69
Discharge: HOME | End: 2024-11-16
Attending: EMERGENCY MEDICINE
Payer: MEDICARE

## 2024-11-16 ENCOUNTER — HOSPITAL ENCOUNTER (OUTPATIENT)
Dept: CARDIOLOGY | Facility: HOSPITAL | Age: 69
Discharge: HOME | End: 2024-11-16
Payer: MEDICARE

## 2024-11-16 ENCOUNTER — APPOINTMENT (OUTPATIENT)
Dept: RADIOLOGY | Facility: HOSPITAL | Age: 69
End: 2024-11-16
Payer: MEDICARE

## 2024-11-16 VITALS
HEART RATE: 82 BPM | DIASTOLIC BLOOD PRESSURE: 105 MMHG | RESPIRATION RATE: 19 BRPM | OXYGEN SATURATION: 95 % | WEIGHT: 160 LBS | SYSTOLIC BLOOD PRESSURE: 133 MMHG | HEIGHT: 66 IN | BODY MASS INDEX: 25.71 KG/M2 | TEMPERATURE: 97.9 F

## 2024-11-16 DIAGNOSIS — R41.89 UNRESPONSIVE: Primary | ICD-10-CM

## 2024-11-16 LAB
ALBUMIN SERPL BCP-MCNC: 4.5 G/DL (ref 3.4–5)
ALP SERPL-CCNC: 53 U/L (ref 33–136)
ALT SERPL W P-5'-P-CCNC: 60 U/L (ref 10–52)
ANION GAP BLDV CALCULATED.4IONS-SCNC: 5 MMOL/L (ref 10–25)
ANION GAP SERPL CALC-SCNC: 16 MMOL/L (ref 10–20)
AST SERPL W P-5'-P-CCNC: 62 U/L (ref 9–39)
BASE EXCESS BLDV CALC-SCNC: 3.2 MMOL/L (ref -2–3)
BASOPHILS # BLD AUTO: 0.06 X10*3/UL (ref 0–0.1)
BASOPHILS NFR BLD AUTO: 0.6 %
BILIRUB SERPL-MCNC: 0.7 MG/DL (ref 0–1.2)
BODY TEMPERATURE: ABNORMAL
BUN SERPL-MCNC: 14 MG/DL (ref 6–23)
CA-I BLDV-SCNC: 1.15 MMOL/L (ref 1.1–1.33)
CALCIUM SERPL-MCNC: 9.3 MG/DL (ref 8.6–10.3)
CARDIAC TROPONIN I PNL SERPL HS: 9 NG/L (ref 0–20)
CHLORIDE BLDV-SCNC: 105 MMOL/L (ref 98–107)
CHLORIDE SERPL-SCNC: 100 MMOL/L (ref 98–107)
CO2 SERPL-SCNC: 27 MMOL/L (ref 21–32)
CREAT SERPL-MCNC: 1.07 MG/DL (ref 0.5–1.3)
EGFRCR SERPLBLD CKD-EPI 2021: 76 ML/MIN/1.73M*2
EOSINOPHIL # BLD AUTO: 0.03 X10*3/UL (ref 0–0.7)
EOSINOPHIL NFR BLD AUTO: 0.3 %
ERYTHROCYTE [DISTWIDTH] IN BLOOD BY AUTOMATED COUNT: 12.9 % (ref 11.5–14.5)
FLUAV RNA RESP QL NAA+PROBE: NOT DETECTED
FLUBV RNA RESP QL NAA+PROBE: NOT DETECTED
GLUCOSE BLDV-MCNC: 199 MG/DL (ref 74–99)
GLUCOSE SERPL-MCNC: 171 MG/DL (ref 74–99)
HCO3 BLDV-SCNC: 29.6 MMOL/L (ref 22–26)
HCT VFR BLD AUTO: 48.8 % (ref 41–52)
HCT VFR BLD EST: 50 % (ref 41–52)
HGB BLD-MCNC: 16.6 G/DL (ref 13.5–17.5)
HGB BLDV-MCNC: 16.7 G/DL (ref 13.5–17.5)
IMM GRANULOCYTES # BLD AUTO: 0.02 X10*3/UL (ref 0–0.7)
IMM GRANULOCYTES NFR BLD AUTO: 0.2 % (ref 0–0.9)
INHALED O2 CONCENTRATION: 21 %
LACTATE BLDV-SCNC: 2.9 MMOL/L (ref 0.4–2)
LACTATE SERPL-SCNC: 2.5 MMOL/L (ref 0.4–2)
LYMPHOCYTES # BLD AUTO: 1.13 X10*3/UL (ref 1.2–4.8)
LYMPHOCYTES NFR BLD AUTO: 11.4 %
MAGNESIUM SERPL-MCNC: 2.05 MG/DL (ref 1.6–2.4)
MCH RBC QN AUTO: 32.2 PG (ref 26–34)
MCHC RBC AUTO-ENTMCNC: 34 G/DL (ref 32–36)
MCV RBC AUTO: 95 FL (ref 80–100)
MONOCYTES # BLD AUTO: 0.65 X10*3/UL (ref 0.1–1)
MONOCYTES NFR BLD AUTO: 6.5 %
NEUTROPHILS # BLD AUTO: 8.06 X10*3/UL (ref 1.2–7.7)
NEUTROPHILS NFR BLD AUTO: 81 %
NRBC BLD-RTO: 0 /100 WBCS (ref 0–0)
OXYHGB MFR BLDV: 48.8 % (ref 45–75)
PCO2 BLDV: 50 MM HG (ref 41–51)
PH BLDV: 7.38 PH (ref 7.33–7.43)
PLATELET # BLD AUTO: 228 X10*3/UL (ref 150–450)
PO2 BLDV: 33 MM HG (ref 35–45)
POTASSIUM BLDV-SCNC: 4 MMOL/L (ref 3.5–5.3)
POTASSIUM SERPL-SCNC: 3.8 MMOL/L (ref 3.5–5.3)
PROT SERPL-MCNC: 7.9 G/DL (ref 6.4–8.2)
RBC # BLD AUTO: 5.15 X10*6/UL (ref 4.5–5.9)
SAO2 % BLDV: 50 % (ref 45–75)
SARS-COV-2 RNA RESP QL NAA+PROBE: NOT DETECTED
SODIUM BLDV-SCNC: 136 MMOL/L (ref 136–145)
SODIUM SERPL-SCNC: 139 MMOL/L (ref 136–145)
WBC # BLD AUTO: 10 X10*3/UL (ref 4.4–11.3)

## 2024-11-16 PROCEDURE — 87636 SARSCOV2 & INF A&B AMP PRB: CPT | Performed by: EMERGENCY MEDICINE

## 2024-11-16 PROCEDURE — 84132 ASSAY OF SERUM POTASSIUM: CPT | Performed by: EMERGENCY MEDICINE

## 2024-11-16 PROCEDURE — 83605 ASSAY OF LACTIC ACID: CPT | Performed by: EMERGENCY MEDICINE

## 2024-11-16 PROCEDURE — 80053 COMPREHEN METABOLIC PANEL: CPT | Performed by: EMERGENCY MEDICINE

## 2024-11-16 PROCEDURE — 84484 ASSAY OF TROPONIN QUANT: CPT | Performed by: EMERGENCY MEDICINE

## 2024-11-16 PROCEDURE — 85025 COMPLETE CBC W/AUTO DIFF WBC: CPT | Performed by: EMERGENCY MEDICINE

## 2024-11-16 PROCEDURE — 93005 ELECTROCARDIOGRAM TRACING: CPT

## 2024-11-16 PROCEDURE — 71045 X-RAY EXAM CHEST 1 VIEW: CPT | Mod: FOREIGN READ | Performed by: RADIOLOGY

## 2024-11-16 PROCEDURE — 36415 COLL VENOUS BLD VENIPUNCTURE: CPT | Performed by: EMERGENCY MEDICINE

## 2024-11-16 PROCEDURE — 70450 CT HEAD/BRAIN W/O DYE: CPT | Performed by: STUDENT IN AN ORGANIZED HEALTH CARE EDUCATION/TRAINING PROGRAM

## 2024-11-16 PROCEDURE — 71045 X-RAY EXAM CHEST 1 VIEW: CPT

## 2024-11-16 PROCEDURE — 83735 ASSAY OF MAGNESIUM: CPT | Performed by: EMERGENCY MEDICINE

## 2024-11-16 PROCEDURE — 99285 EMERGENCY DEPT VISIT HI MDM: CPT | Mod: 25

## 2024-11-16 PROCEDURE — 70450 CT HEAD/BRAIN W/O DYE: CPT

## 2024-11-16 ASSESSMENT — PAIN - FUNCTIONAL ASSESSMENT: PAIN_FUNCTIONAL_ASSESSMENT: 0-10

## 2024-11-16 ASSESSMENT — COLUMBIA-SUICIDE SEVERITY RATING SCALE - C-SSRS
1. IN THE PAST MONTH, HAVE YOU WISHED YOU WERE DEAD OR WISHED YOU COULD GO TO SLEEP AND NOT WAKE UP?: NO
2. HAVE YOU ACTUALLY HAD ANY THOUGHTS OF KILLING YOURSELF?: NO
6. HAVE YOU EVER DONE ANYTHING, STARTED TO DO ANYTHING, OR PREPARED TO DO ANYTHING TO END YOUR LIFE?: NO

## 2024-11-16 ASSESSMENT — LIFESTYLE VARIABLES
EVER FELT BAD OR GUILTY ABOUT YOUR DRINKING: NO
EVER HAD A DRINK FIRST THING IN THE MORNING TO STEADY YOUR NERVES TO GET RID OF A HANGOVER: NO
HAVE PEOPLE ANNOYED YOU BY CRITICIZING YOUR DRINKING: NO
TOTAL SCORE: 0
HAVE YOU EVER FELT YOU SHOULD CUT DOWN ON YOUR DRINKING: NO

## 2024-11-16 ASSESSMENT — PAIN SCALES - GENERAL: PAINLEVEL_OUTOF10: 0 - NO PAIN

## 2024-11-17 NOTE — ED TRIAGE NOTES
"Patient from home had witnessed \"abscence seizure\" by family per ems. Family reports seizure lasted 2-3min. Patient now is A&Ox4 gcs 15. Patient denies any hx of seizure.   "

## 2024-11-17 NOTE — ED PROVIDER NOTES
HPI   No chief complaint on file.      68-year-old male here for chief complaint of a possible seizure by EMS.  Patient was blowing leaves all day and sat down.  His daughter states that he had an absence seizure.  Patient does not remember anything states he does not remember having any type of seizure or passing out.  He does usually drink 12 cans of beer a week and smokes marijuana but he states he only had a half a beer today.  He denies any other symptoms or complaints he denies any recent illness fevers chills or night sweats.                  Patient History   No past medical history on file.  No past surgical history on file.  No family history on file.  Social History     Tobacco Use    Smoking status: Former     Types: Cigarettes     Passive exposure: Past    Smokeless tobacco: Never   Vaping Use    Vaping status: Never Used   Substance Use Topics    Alcohol use: Yes     Alcohol/week: 12.0 standard drinks of alcohol     Types: 12 Cans of beer per week    Drug use: Yes     Types: Marijuana       Physical Exam   ED Triage Vitals   Temperature Heart Rate Respirations BP   11/16/24 2014 11/16/24 2014 11/16/24 2014 11/16/24 2016   36.6 °C (97.9 °F) 97 20 (!) 154/120      Pulse Ox Temp src Heart Rate Source Patient Position   11/16/24 2014 -- -- --   96 %         BP Location FiO2 (%)     -- --             Physical Exam  Vitals and nursing note reviewed.   Constitutional:       Appearance: Normal appearance.   HENT:      Head: Normocephalic and atraumatic.      Nose: Nose normal.      Mouth/Throat:      Mouth: Mucous membranes are moist.   Eyes:      Extraocular Movements: Extraocular movements intact.      Pupils: Pupils are equal, round, and reactive to light.   Cardiovascular:      Rate and Rhythm: Normal rate and regular rhythm.   Pulmonary:      Effort: Pulmonary effort is normal.      Breath sounds: Normal breath sounds.   Abdominal:      General: Abdomen is flat.      Palpations: Abdomen is soft.    Musculoskeletal:         General: Normal range of motion.      Cervical back: Normal range of motion.   Skin:     General: Skin is warm and dry.      Capillary Refill: Capillary refill takes less than 2 seconds.   Neurological:      General: No focal deficit present.      Mental Status: He is alert and oriented to person, place, and time.      Comments: NIH of 0   Psychiatric:         Mood and Affect: Mood normal.           ED Course & MDM   ED Course as of 11/23/24 0339   Sat Nov 16, 2024 2037 EKG interpreted by me shows a heart rate of 92 normal sinus rhythm with a marked sinus arrhythmia normal axis and intervals otherwise [TP]   2131 Lactate(!): 2.5 [TP]   2131 ALT(!): 60 [TP]   2131 Total Protein: 7.9 [TP]   2131 AST(!): 62 [TP]   2215 Patient was worked up found have a slightly elevated lactate.  He was able to drink water without difficulty.  His head CT is normal.  It is very difficult to tell whether he had a seizure or not.  He said he overdid it today and overworked himself.  I do think this had been contributing to his episode.  His daughter stated he just stared straight forward but he was able to answer questions he does answer them slowly. I will give him neurology follow up in a week.   Different includes tumor, mass, new onset seizure, dehydration  Considered chest abd pelvis ct [TP]      ED Course User Index  [TP] Natalie Light,          Diagnoses as of 11/23/24 0339   Unresponsive                 No data recorded     [place] Coma Scale Score: 15 (11/16/24 2022 : Wisam Aly RN)                           Medical Decision Making  Medical Decision Making:   [unfilled]     Natalie Light D.O.  Emergency Medicine          Procedure  Procedures     Natalie Light,   11/16/24 2219       Natalie Light,   11/23/24 0340

## 2024-11-19 ENCOUNTER — HOSPITAL ENCOUNTER (OUTPATIENT)
Dept: RADIOLOGY | Facility: HOSPITAL | Age: 69
Discharge: HOME | End: 2024-11-19
Payer: MEDICARE

## 2024-11-19 DIAGNOSIS — K76.0 FATTY (CHANGE OF) LIVER, NOT ELSEWHERE CLASSIFIED: ICD-10-CM

## 2024-11-19 PROCEDURE — 76705 ECHO EXAM OF ABDOMEN: CPT

## 2024-11-19 PROCEDURE — 76705 ECHO EXAM OF ABDOMEN: CPT | Performed by: RADIOLOGY

## 2024-11-22 ENCOUNTER — HOSPITAL ENCOUNTER (OUTPATIENT)
Dept: RADIOLOGY | Facility: HOSPITAL | Age: 69
Discharge: HOME | End: 2024-11-22
Payer: MEDICARE

## 2024-11-22 DIAGNOSIS — J18.9 PNEUMONIA, UNSPECIFIED ORGANISM: ICD-10-CM

## 2024-11-22 PROCEDURE — 71250 CT THORAX DX C-: CPT

## 2024-11-27 LAB
ATRIAL RATE: 92 BPM
P AXIS: 47 DEGREES
P OFFSET: 197 MS
P ONSET: 148 MS
PR INTERVAL: 156 MS
Q ONSET: 226 MS
QRS COUNT: 15 BEATS
QRS DURATION: 88 MS
QT INTERVAL: 370 MS
QTC CALCULATION(BAZETT): 457 MS
QTC FREDERICIA: 426 MS
R AXIS: 1 DEGREES
T AXIS: 44 DEGREES
T OFFSET: 411 MS
VENTRICULAR RATE: 92 BPM

## 2024-12-23 ASSESSMENT — ENCOUNTER SYMPTOMS
DIZZINESS: 0
DIFFICULTY URINATING: 0
SHORTNESS OF BREATH: 0
DIARRHEA: 0
NAUSEA: 0
ENDOCRINE NEGATIVE: 1
HEMATOCHEZIA: 1
FEVER: 0

## 2024-12-23 NOTE — PROGRESS NOTES
"Subjective   Patient ID: Arian Iglesias is a 69 y.o. male who presents for Rectal Bleeding (X 6 month ).    Bloody stools at times  Fatigue at times  Family history of coronary artery disease  History of abnormal thyroid on imaging    Rectal Bleeding  Pertinent negatives include no chest pain, fever, nausea or rash.        Review of Systems   Constitutional:  Negative for fever.        Also see HPI   Eyes:  Negative for visual disturbance.   Respiratory:  Negative for shortness of breath.    Cardiovascular:  Negative for chest pain.   Gastrointestinal:  Positive for hematochezia. Negative for diarrhea and nausea.   Endocrine: Negative.    Genitourinary:  Negative for difficulty urinating.   Skin:  Negative for rash.   Neurological:  Negative for dizziness.        No focal deficits   Psychiatric/Behavioral:  Negative for suicidal ideas.    All other systems reviewed and are negative.      Objective   /70   Pulse 60   Temp 36.8 °C (98.2 °F) (Temporal)   Ht 1.626 m (5' 4\")   Wt 74.3 kg (163 lb 12.8 oz)   SpO2 95%   BMI 28.12 kg/m²     Physical Exam  Vitals and nursing note reviewed.   Constitutional:       Appearance: Normal appearance.   HENT:      Head: Normocephalic and atraumatic.   Eyes:      Conjunctiva/sclera: Conjunctivae normal.   Cardiovascular:      Rate and Rhythm: Normal rate and regular rhythm.      Heart sounds: Normal heart sounds.   Pulmonary:      Effort: Pulmonary effort is normal.      Breath sounds: Normal breath sounds.   Neurological:      Mental Status: He is oriented to person, place, and time.   Psychiatric:         Mood and Affect: Mood normal.         Behavior: Behavior normal.         Assessment/Plan   Diagnoses and all orders for this visit:  Hematochezia  -     Referral to Gastroenterology; Future  Family history of coronary artery disease  -     CT cardiac scoring wo IV contrast; Future  -     Referral to Cardiology; Future  Abnormal imaging of thyroid  -     US thyroid; " Future

## 2025-01-08 ENCOUNTER — OFFICE VISIT (OUTPATIENT)
Dept: PRIMARY CARE | Facility: CLINIC | Age: 70
End: 2025-01-08
Payer: MEDICARE

## 2025-01-08 VITALS
SYSTOLIC BLOOD PRESSURE: 158 MMHG | HEART RATE: 74 BPM | DIASTOLIC BLOOD PRESSURE: 80 MMHG | TEMPERATURE: 98.2 F | BODY MASS INDEX: 26.68 KG/M2 | HEIGHT: 66 IN | WEIGHT: 166 LBS | OXYGEN SATURATION: 96 %

## 2025-01-08 DIAGNOSIS — M54.9 DORSALGIA: ICD-10-CM

## 2025-01-08 DIAGNOSIS — I10 PRIMARY HYPERTENSION: ICD-10-CM

## 2025-01-08 DIAGNOSIS — M54.32 SCIATICA OF LEFT SIDE: ICD-10-CM

## 2025-01-08 DIAGNOSIS — E04.1 THYROID NODULE: Primary | ICD-10-CM

## 2025-01-08 PROCEDURE — 99214 OFFICE O/P EST MOD 30 MIN: CPT | Performed by: FAMILY MEDICINE

## 2025-01-08 PROCEDURE — 1159F MED LIST DOCD IN RCRD: CPT | Performed by: FAMILY MEDICINE

## 2025-01-08 PROCEDURE — 3077F SYST BP >= 140 MM HG: CPT | Performed by: FAMILY MEDICINE

## 2025-01-08 PROCEDURE — 3008F BODY MASS INDEX DOCD: CPT | Performed by: FAMILY MEDICINE

## 2025-01-08 PROCEDURE — 1160F RVW MEDS BY RX/DR IN RCRD: CPT | Performed by: FAMILY MEDICINE

## 2025-01-08 PROCEDURE — 1125F AMNT PAIN NOTED PAIN PRSNT: CPT | Performed by: FAMILY MEDICINE

## 2025-01-08 PROCEDURE — 3079F DIAST BP 80-89 MM HG: CPT | Performed by: FAMILY MEDICINE

## 2025-01-08 RX ORDER — TIZANIDINE 4 MG/1
4 TABLET ORAL 3 TIMES DAILY PRN
Qty: 30 TABLET | Refills: 1 | Status: SHIPPED | OUTPATIENT
Start: 2025-01-08

## 2025-01-08 RX ORDER — PANTOPRAZOLE SODIUM 40 MG/1
1 TABLET, DELAYED RELEASE ORAL
COMMUNITY
Start: 2024-11-06

## 2025-01-08 RX ORDER — VALSARTAN 160 MG/1
160 TABLET ORAL DAILY
Qty: 30 TABLET | Refills: 11 | Status: SHIPPED | OUTPATIENT
Start: 2025-01-08 | End: 2026-02-12

## 2025-01-08 RX ORDER — PREDNISONE 10 MG/1
TABLET ORAL
Qty: 21 TABLET | Refills: 0 | Status: SHIPPED | OUTPATIENT
Start: 2025-01-08 | End: 2025-01-13

## 2025-01-08 ASSESSMENT — PATIENT HEALTH QUESTIONNAIRE - PHQ9
2. FEELING DOWN, DEPRESSED OR HOPELESS: NOT AT ALL
1. LITTLE INTEREST OR PLEASURE IN DOING THINGS: NOT AT ALL
SUM OF ALL RESPONSES TO PHQ9 QUESTIONS 1 AND 2: 0

## 2025-01-08 ASSESSMENT — PAIN SCALES - GENERAL: PAINLEVEL_OUTOF10: 10-WORST PAIN EVER

## 2025-01-08 NOTE — PROGRESS NOTES
"Subjective   Patient ID: Arian Iglesias is a 69 y.o. male who presents for Follow-up (Rectal bleeding, abnormal thyroid imaging).    HPI     Review of Systems    Objective   /80   Pulse 74   Temp 36.8 °C (98.2 °F)   Ht 1.676 m (5' 6\")   Wt 75.3 kg (166 lb)   SpO2 96%   BMI 26.79 kg/m²     Physical Exam    Assessment/Plan   There are no diagnoses linked to this encounter.       " times a day as needed for muscle spasms.  -     predniSONE (Deltasone) 10 mg tablet; Take 6 tablets (60 mg) by mouth once daily for 1 day, THEN 5 tablets (50 mg) once daily for 1 day, THEN 4 tablets (40 mg) once daily for 1 day, THEN 3 tablets (30 mg) once daily for 1 day, THEN 2 tablets (20 mg) once daily for 1 day, THEN 1 tablet (10 mg) once daily for 1 day.  Sciatica of left side  -     tiZANidine (Zanaflex) 4 mg tablet; Take 1 tablet (4 mg) by mouth 3 times a day as needed for muscle spasms.  -     predniSONE (Deltasone) 10 mg tablet; Take 6 tablets (60 mg) by mouth once daily for 1 day, THEN 5 tablets (50 mg) once daily for 1 day, THEN 4 tablets (40 mg) once daily for 1 day, THEN 3 tablets (30 mg) once daily for 1 day, THEN 2 tablets (20 mg) once daily for 1 day, THEN 1 tablet (10 mg) once daily for 1 day.  Primary hypertension  -     Follow Up In Primary Care - Established; Future

## 2025-02-05 ENCOUNTER — OFFICE VISIT (OUTPATIENT)
Dept: PRIMARY CARE | Facility: CLINIC | Age: 70
End: 2025-02-05
Payer: MEDICARE

## 2025-02-05 VITALS
OXYGEN SATURATION: 98 % | HEIGHT: 66 IN | WEIGHT: 165.2 LBS | HEART RATE: 67 BPM | SYSTOLIC BLOOD PRESSURE: 138 MMHG | TEMPERATURE: 97.2 F | BODY MASS INDEX: 26.55 KG/M2 | DIASTOLIC BLOOD PRESSURE: 72 MMHG

## 2025-02-05 DIAGNOSIS — I10 PRIMARY HYPERTENSION: ICD-10-CM

## 2025-02-05 PROCEDURE — 3008F BODY MASS INDEX DOCD: CPT | Performed by: FAMILY MEDICINE

## 2025-02-05 PROCEDURE — 3078F DIAST BP <80 MM HG: CPT | Performed by: FAMILY MEDICINE

## 2025-02-05 PROCEDURE — 3075F SYST BP GE 130 - 139MM HG: CPT | Performed by: FAMILY MEDICINE

## 2025-02-05 PROCEDURE — 1160F RVW MEDS BY RX/DR IN RCRD: CPT | Performed by: FAMILY MEDICINE

## 2025-02-05 PROCEDURE — 1159F MED LIST DOCD IN RCRD: CPT | Performed by: FAMILY MEDICINE

## 2025-02-05 PROCEDURE — 1125F AMNT PAIN NOTED PAIN PRSNT: CPT | Performed by: FAMILY MEDICINE

## 2025-02-05 PROCEDURE — 99213 OFFICE O/P EST LOW 20 MIN: CPT | Performed by: FAMILY MEDICINE

## 2025-02-05 ASSESSMENT — PATIENT HEALTH QUESTIONNAIRE - PHQ9
2. FEELING DOWN, DEPRESSED OR HOPELESS: NOT AT ALL
SUM OF ALL RESPONSES TO PHQ9 QUESTIONS 1 AND 2: 0
1. LITTLE INTEREST OR PLEASURE IN DOING THINGS: NOT AT ALL

## 2025-02-05 ASSESSMENT — ENCOUNTER SYMPTOMS
DIARRHEA: 0
NAUSEA: 0
DIFFICULTY URINATING: 0
SHORTNESS OF BREATH: 0
FEVER: 0
ENDOCRINE NEGATIVE: 1
DIZZINESS: 0

## 2025-02-05 ASSESSMENT — PAIN SCALES - GENERAL: PAINLEVEL_OUTOF10: 8

## 2025-02-05 NOTE — PROGRESS NOTES
"Subjective   Patient ID: Arian Iglesias is a 69 y.o. male who presents for Follow-up (HTN/Accompanied by wife).    HPI   The pt presents to the clinic for 1-month medication check. Past medical hx of HTN and GERD. He is accompanied by his wife in the clinic today.    -Hypertension: BP was 138/72 when checked in clinic today. Continues on valsartan 160 mg daily. Doing well on this med and no side-effects reported.    -Health Maintenance: Pt scheduled for CT cardiac scoring study later this month. Will consult with cardiology after this study. Labs/tests ordered for pt.    -Thyroid nodules: Nodules observed on ultrasound from 11/14/2024. Pt intends to consult with a specialist for evaluation of this condition in near future.    -Hematochezia (bloody stools): Pt had been suffering from intermittent bloody stools previously. Was referred to gastroenterology during visit in November 2024. Pt had colonoscopy/EGD screening for further investigation on 1/31/2025. Internal/external hemorrhoids were observed during this screening and he also had polyp removed during this screening.     Review of Systems   Constitutional:  Negative for fever.        Also see HPI   Eyes:  Negative for visual disturbance.   Respiratory:  Negative for shortness of breath.    Cardiovascular:  Negative for chest pain.   Gastrointestinal:  Negative for diarrhea and nausea.   Endocrine: Negative.    Genitourinary:  Negative for difficulty urinating.   Skin:  Negative for rash.   Neurological:  Negative for dizziness.        No focal deficits   Psychiatric/Behavioral:  Negative for suicidal ideas.    All other systems reviewed and are negative.      Objective   /72   Pulse 67   Temp 36.2 °C (97.2 °F)   Ht 1.676 m (5' 6\")   Wt 74.9 kg (165 lb 3.2 oz)   SpO2 98%   BMI 26.66 kg/m²     Physical Exam  Vitals and nursing note reviewed.   Constitutional:       Appearance: Normal appearance.   HENT:      Head: Normocephalic and atraumatic. "   Eyes:      Conjunctiva/sclera: Conjunctivae normal.   Cardiovascular:      Rate and Rhythm: Normal rate and regular rhythm.      Heart sounds: Normal heart sounds.   Pulmonary:      Effort: Pulmonary effort is normal.      Breath sounds: Normal breath sounds.   Neurological:      Mental Status: He is oriented to person, place, and time.   Psychiatric:         Mood and Affect: Mood normal.         Behavior: Behavior normal.         Assessment/Plan   Diagnoses and all orders for this visit:  Primary hypertension  -     Follow Up In Primary Care - Established  -     Lipid Panel; Future  -     Comprehensive Metabolic Panel; Future         Scribe Attestation  By signing my name below, IEdvin Scribe   attest that this documentation has been prepared under the direction and in the presence of Vidal Alva DO.

## 2025-02-10 DIAGNOSIS — I10 PRIMARY HYPERTENSION: ICD-10-CM

## 2025-02-10 RX ORDER — VALSARTAN 160 MG/1
160 TABLET ORAL DAILY
Qty: 90 TABLET | Refills: 3 | Status: SHIPPED | OUTPATIENT
Start: 2025-02-10 | End: 2026-02-10

## 2025-02-10 NOTE — TELEPHONE ENCOUNTER
Last seen: 02/05/2025  Last Filled:    01/08/2025 #30 Refills:11     Patients insurance will not cover medication unless is is written as a 90 day supply.     Order pended for review and signature.

## 2025-02-12 ASSESSMENT — ENCOUNTER SYMPTOMS
SHORTNESS OF BREATH: 0
DIFFICULTY URINATING: 0
NAUSEA: 0
DIZZINESS: 0
ENDOCRINE NEGATIVE: 1
DIARRHEA: 0
FEVER: 0

## 2025-02-21 ENCOUNTER — APPOINTMENT (OUTPATIENT)
Dept: CARDIOLOGY | Facility: CLINIC | Age: 70
End: 2025-02-21
Payer: MEDICARE

## 2025-02-21 ENCOUNTER — HOSPITAL ENCOUNTER (OUTPATIENT)
Dept: RADIOLOGY | Facility: HOSPITAL | Age: 70
Discharge: HOME | End: 2025-02-21
Payer: MEDICARE

## 2025-02-21 VITALS
HEIGHT: 66 IN | BODY MASS INDEX: 26.2 KG/M2 | DIASTOLIC BLOOD PRESSURE: 64 MMHG | TEMPERATURE: 98.8 F | RESPIRATION RATE: 18 BRPM | SYSTOLIC BLOOD PRESSURE: 147 MMHG | WEIGHT: 163 LBS | HEART RATE: 58 BPM | OXYGEN SATURATION: 94 %

## 2025-02-21 DIAGNOSIS — Z82.49 FAMILY HISTORY OF CORONARY ARTERY DISEASE: ICD-10-CM

## 2025-02-21 DIAGNOSIS — R73.9 ELEVATED BLOOD SUGAR: ICD-10-CM

## 2025-02-21 DIAGNOSIS — E78.2 MIXED HYPERLIPIDEMIA: Primary | ICD-10-CM

## 2025-02-21 DIAGNOSIS — I10 PRIMARY HYPERTENSION: ICD-10-CM

## 2025-02-21 PROBLEM — E78.5 HYPERLIPIDEMIA: Status: ACTIVE | Noted: 2025-02-21

## 2025-02-21 PROCEDURE — 3078F DIAST BP <80 MM HG: CPT | Performed by: INTERNAL MEDICINE

## 2025-02-21 PROCEDURE — 3008F BODY MASS INDEX DOCD: CPT | Performed by: INTERNAL MEDICINE

## 2025-02-21 PROCEDURE — 3077F SYST BP >= 140 MM HG: CPT | Performed by: INTERNAL MEDICINE

## 2025-02-21 PROCEDURE — G2211 COMPLEX E/M VISIT ADD ON: HCPCS | Performed by: INTERNAL MEDICINE

## 2025-02-21 PROCEDURE — 1036F TOBACCO NON-USER: CPT | Performed by: INTERNAL MEDICINE

## 2025-02-21 PROCEDURE — 75571 CT HRT W/O DYE W/CA TEST: CPT

## 2025-02-21 PROCEDURE — 1126F AMNT PAIN NOTED NONE PRSNT: CPT | Performed by: INTERNAL MEDICINE

## 2025-02-21 PROCEDURE — 1159F MED LIST DOCD IN RCRD: CPT | Performed by: INTERNAL MEDICINE

## 2025-02-21 PROCEDURE — 1160F RVW MEDS BY RX/DR IN RCRD: CPT | Performed by: INTERNAL MEDICINE

## 2025-02-21 PROCEDURE — 99213 OFFICE O/P EST LOW 20 MIN: CPT | Performed by: INTERNAL MEDICINE

## 2025-02-21 RX ORDER — ATORVASTATIN CALCIUM 40 MG/1
40 TABLET, FILM COATED ORAL DAILY
Qty: 90 TABLET | Refills: 3 | Status: SHIPPED | OUTPATIENT
Start: 2025-02-21 | End: 2026-02-21

## 2025-02-21 ASSESSMENT — ENCOUNTER SYMPTOMS
OCCASIONAL FEELINGS OF UNSTEADINESS: 0
DEPRESSION: 0
LOSS OF SENSATION IN FEET: 0

## 2025-02-21 ASSESSMENT — LIFESTYLE VARIABLES
HOW OFTEN DURING THE LAST YEAR HAVE YOU FOUND THAT YOU WERE NOT ABLE TO STOP DRINKING ONCE YOU HAD STARTED: NEVER
HAS A RELATIVE, FRIEND, DOCTOR, OR ANOTHER HEALTH PROFESSIONAL EXPRESSED CONCERN ABOUT YOUR DRINKING OR SUGGESTED YOU CUT DOWN: NO
HAVE YOU OR SOMEONE ELSE BEEN INJURED AS A RESULT OF YOUR DRINKING: NO
HOW OFTEN DO YOU HAVE SIX OR MORE DRINKS ON ONE OCCASION: NEVER
SKIP TO QUESTIONS 9-10: 0
HOW OFTEN DURING THE LAST YEAR HAVE YOU BEEN UNABLE TO REMEMBER WHAT HAPPENED THE NIGHT BEFORE BECAUSE YOU HAD BEEN DRINKING: NEVER
AUDIT-C TOTAL SCORE: 5
HOW OFTEN DURING THE LAST YEAR HAVE YOU NEEDED AN ALCOHOLIC DRINK FIRST THING IN THE MORNING TO GET YOURSELF GOING AFTER A NIGHT OF HEAVY DRINKING: NEVER
HOW OFTEN DURING THE LAST YEAR HAVE YOU HAD A FEELING OF GUILT OR REMORSE AFTER DRINKING: NEVER
HOW MANY STANDARD DRINKS CONTAINING ALCOHOL DO YOU HAVE ON A TYPICAL DAY: 3 OR 4
HOW OFTEN DURING THE LAST YEAR HAVE YOU FAILED TO DO WHAT WAS NORMALLY EXPECTED FROM YOU BECAUSE OF DRINKING: NEVER
AUDIT TOTAL SCORE: 5
HOW OFTEN DO YOU HAVE A DRINK CONTAINING ALCOHOL: 4 OR MORE TIMES A WEEK

## 2025-02-21 ASSESSMENT — PAIN SCALES - GENERAL: PAINLEVEL_OUTOF10: 0-NO PAIN

## 2025-02-21 NOTE — PROGRESS NOTES
Subjective   Chief Complaint   Patient presents with    Hospital Follow-up     Arian Iglesias is a/an new patient who presents to the office today for a hospital follow up.         69-year-old patient with a multiple comorbid condition for history of pulmonary embolism currently on Eliquis.  History of GI bleed in the past.  Also family history of premature coronary disease as well as hyperlipidemia.  Also history of primary hypertension currently on Diovan 160 mg OP daily.  No active chest pain tightness.  Electrocardiogram was done in November essentially showed underlying sinus arrhythmia with a nonspecific ST-T changes in the left atrial abnormality.  Unchanged EKG from previously.  Comprehensive metabolic panel done in November essentially showed glucose elevated 171 otherwise unremarkable.  AST ALT minimally elevated.  Magnesium was normal CBC was done essentially within normal range.  Patient did not have a hemoglobin A1c done however.  Lipid profile done about 4 years ago showed total cholesterol 208 LDL is 111 and triglyceride 97.    Past Medical History:   Diagnosis Date    Hyperlipidemia 02/21/2025     History reviewed. No pertinent surgical history.  No relevant family history has been documented for this patient.  Current Outpatient Medications   Medication Sig Dispense Refill    apixaban (Eliquis) 5 mg tablet Take 2 tablets (10 mg) by mouth 2 times a day. Take 10 mg twice daily for 7 days then take 5 mg twice daily from day 8 on. (Patient taking differently: Take 2 tablets (10 mg) by mouth once daily. Take 10 mg twice daily for 7 days then take 5 mg twice daily from day 8 on.) 74 tablet 0    multivitamin with minerals tablet Take 1 tablet by mouth once daily.      pantoprazole (ProtoNix) 40 mg EC tablet Take 1 tablet (40 mg) by mouth early in the morning..      thiamine 100 mg tablet Take 1 tablet (100 mg) by mouth once daily.      tiZANidine (Zanaflex) 4 mg tablet Take 1 tablet (4 mg) by mouth 3  "times a day as needed for muscle spasms. 30 tablet 1    valsartan (Diovan) 160 mg tablet Take 1 tablet (160 mg) by mouth once daily. 90 tablet 3    acetaminophen (Tylenol) 325 mg tablet Take 2 tablets (650 mg) by mouth every 4 hours if needed for mild pain (1 - 3), headaches or fever (temp greater than 38.0 C).       No current facility-administered medications for this visit.      reports that he has quit smoking. His smoking use included cigarettes. He has been exposed to tobacco smoke. He has never used smokeless tobacco. He reports current alcohol use of about 12.0 standard drinks of alcohol per week. He reports current drug use. Frequency: 3.00 times per week. Drug: Marijuana.  Allergies:  Patient has no known allergies.    ROS: See HPI  CONSTITUTIONAL: Chills- none. Fever- none. Weight change appropriate for age.  HEENT: Headache- Negative.  Change in vision- none.  Ear pain- none. Nasal congestion- none. Post-nasal drip-none.  Sore throat-none.  CARDIOLOGY: Chest pain- none.  Leg edema-trace.  Murmurs-soft systolic.  Palpitation- none.  RESPIRATORY: Denies any shortness of breath.  GI: Abdominal pain- none.  Change in bowel habits- none.  Constipation- none.  Diarrhea- none.  Nausea- none.  Vomiting- none.  MUSCULOSKELETAL: Joint pain- none.  Muscle aches- none.  DERMATOLOGY: Rash- none.  NEUROLOGY: Dizziness- none.   Headache- none.  PSYCHIATRY: Denies any depression or anxiety     Vitals:    02/21/25 1410   BP: 147/64   Pulse: 58   Resp: 18   Temp: 37.1 °C (98.8 °F)   SpO2: 94%   Weight: 73.9 kg (163 lb)   Height: 1.676 m (5' 6\")   PainSc: 0-No pain      BMI:Body mass index is 26.31 kg/m².   General Cardiology:  General Appearance: Alert, oriented and in no acute distress.  HEENT: extra ocular movements intact (EOMI), pupils equal,  round, reactive to light and accommodation (PERRLA).  Carotid Upstroke: no bruit, normal.  Jugular Venous Distention (JVD): flat.  Chest: normal.  Lungs: Clear to auscultation, "   Heart Sounds: no S3 or S4, normal S1, S2, regular rate.  Murmur, Click, Gallop: no systolic murmur.  Abdomen: no hepatomegaly, no masses felt, soft.  Extremities: no leg edema.  Peripheral pulses: 2 plus bilateral.  NEUROLOGY Cranial nerves II-XII grossly intact.     Patient Active Problem List   Diagnosis    Pneumonia of right lung due to infectious organism, unspecified part of lung    Unresponsive    Hyperlipidemia    Family history of coronary artery disease    Primary hypertension       Problem List Items Addressed This Visit       Hyperlipidemia - Primary    Family history of coronary artery disease    Primary hypertension      69-year patient with a history of hyperlipidemia family history of premature coronary disease.  History of PE in the past currently on Eliquis.  Also history hyperlipidemia.  1.  Hypertension: Continue current Diovan daily.  2.  Hyperlipidemia: Pending lipid profile today.  Also will order patient for hemoglobin A1c.  Start patients on a low-dose of Lipitor 40 mg topically daily.    Advised patient to avoid lunch meats, canned soups, pizzas, bread rolls, and sandwiches. Advised patient to limit salt intake 1,500 mg daily. Advised patient to exercise 30 mins/3 times a week including treadmill or aerobic type, Goal to achieve 65% target HR.    Diet and exercise reviewed with patient..advice to walk about 10,000 steps or about 2 hours during day time. Cut back on salt, sugar and flour.    Pt. care time is spent includes review of diagnostic tests, labs, radiographs, EKGs, old echoes, cardiac work-up and coordination of care. Assessment, impression and plans are reflected in the note above as well as the orders.    Jimmy Mata MD

## 2025-02-22 LAB
ALBUMIN SERPL-MCNC: 4.5 G/DL (ref 3.6–5.1)
ALP SERPL-CCNC: 46 U/L (ref 35–144)
ALT SERPL-CCNC: 46 U/L (ref 9–46)
ANION GAP SERPL CALCULATED.4IONS-SCNC: 8 MMOL/L (CALC) (ref 7–17)
AST SERPL-CCNC: 46 U/L (ref 10–35)
BILIRUB SERPL-MCNC: 0.8 MG/DL (ref 0.2–1.2)
BUN SERPL-MCNC: 11 MG/DL (ref 7–25)
CALCIUM SERPL-MCNC: 9.5 MG/DL (ref 8.6–10.3)
CHLORIDE SERPL-SCNC: 105 MMOL/L (ref 98–110)
CHOLEST SERPL-MCNC: 223 MG/DL
CHOLEST/HDLC SERPL: 3.5 (CALC)
CO2 SERPL-SCNC: 28 MMOL/L (ref 20–32)
CREAT SERPL-MCNC: 0.82 MG/DL (ref 0.7–1.35)
EGFRCR SERPLBLD CKD-EPI 2021: 95 ML/MIN/1.73M2
GLUCOSE SERPL-MCNC: 94 MG/DL (ref 65–99)
HDLC SERPL-MCNC: 64 MG/DL
LDLC SERPL CALC-MCNC: 129 MG/DL (CALC)
NONHDLC SERPL-MCNC: 159 MG/DL (CALC)
POTASSIUM SERPL-SCNC: 4.8 MMOL/L (ref 3.5–5.3)
PROT SERPL-MCNC: 7.2 G/DL (ref 6.1–8.1)
SODIUM SERPL-SCNC: 141 MMOL/L (ref 135–146)
TRIGL SERPL-MCNC: 189 MG/DL

## 2025-05-01 ENCOUNTER — ANCILLARY PROCEDURE (OUTPATIENT)
Dept: URGENT CARE | Age: 70
End: 2025-05-01
Payer: MEDICARE

## 2025-05-01 ENCOUNTER — OFFICE VISIT (OUTPATIENT)
Dept: URGENT CARE | Age: 70
End: 2025-05-01
Payer: MEDICARE

## 2025-05-01 VITALS
RESPIRATION RATE: 16 BRPM | SYSTOLIC BLOOD PRESSURE: 136 MMHG | TEMPERATURE: 98.3 F | OXYGEN SATURATION: 93 % | DIASTOLIC BLOOD PRESSURE: 75 MMHG | HEART RATE: 85 BPM

## 2025-05-01 DIAGNOSIS — R06.2 WHEEZING: ICD-10-CM

## 2025-05-01 DIAGNOSIS — R05.9 COUGH, UNSPECIFIED TYPE: ICD-10-CM

## 2025-05-01 DIAGNOSIS — R05.9 COUGH, UNSPECIFIED TYPE: Primary | ICD-10-CM

## 2025-05-01 DIAGNOSIS — J40 BRONCHITIS: ICD-10-CM

## 2025-05-01 DIAGNOSIS — B34.8 RHINOVIRUS: ICD-10-CM

## 2025-05-01 LAB
POC CORONAVIRUS SARS-COV-2 PCR: NEGATIVE
POC HUMAN RHINOVIRUS PCR: POSITIVE
POC INFLUENZA A VIRUS PCR: NEGATIVE
POC INFLUENZA B VIRUS PCR: NEGATIVE
POC RESPIRATORY SYNCYTIAL VIRUS PCR: NEGATIVE

## 2025-05-01 PROCEDURE — 71046 X-RAY EXAM CHEST 2 VIEWS: CPT | Performed by: REGISTERED NURSE

## 2025-05-01 RX ORDER — METHYLPREDNISOLONE 4 MG/1
TABLET ORAL
Qty: 21 TABLET | Refills: 0 | Status: SHIPPED | OUTPATIENT
Start: 2025-05-01

## 2025-05-01 RX ORDER — ALBUTEROL SULFATE 90 UG/1
2 INHALANT RESPIRATORY (INHALATION) EVERY 6 HOURS PRN
Qty: 18 G | Refills: 0 | Status: SHIPPED | OUTPATIENT
Start: 2025-05-01 | End: 2026-05-01

## 2025-05-01 RX ORDER — IPRATROPIUM BROMIDE AND ALBUTEROL SULFATE 2.5; .5 MG/3ML; MG/3ML
3 SOLUTION RESPIRATORY (INHALATION) ONCE
Status: COMPLETED | OUTPATIENT
Start: 2025-05-01 | End: 2025-05-01

## 2025-05-01 RX ORDER — AMOXICILLIN AND CLAVULANATE POTASSIUM 875; 125 MG/1; MG/1
875 TABLET, FILM COATED ORAL 2 TIMES DAILY
Qty: 20 TABLET | Refills: 0 | Status: SHIPPED | OUTPATIENT
Start: 2025-05-01

## 2025-05-01 RX ADMIN — IPRATROPIUM BROMIDE AND ALBUTEROL SULFATE 3 ML: 2.5; .5 SOLUTION RESPIRATORY (INHALATION) at 14:31

## 2025-05-01 ASSESSMENT — ENCOUNTER SYMPTOMS
CHILLS: 0
DIARRHEA: 0
SORE THROAT: 1
VOMITING: 0
WHEEZING: 0
COUGH: 1
CHEST TIGHTNESS: 1
SINUS PAIN: 0
NAUSEA: 0
RHINORRHEA: 0
SINUS PRESSURE: 0
FATIGUE: 0
SHORTNESS OF BREATH: 1
FEVER: 0
HEADACHES: 1

## 2025-05-01 NOTE — PROGRESS NOTES
Subjective   Patient ID: Arian Iglesias is a 69 y.o. male. They present today with a chief complaint of Cough, Headache, and chest congestion (For 1 week).    History of Present Illness  See mdm        History provided by:  Patient      Past Medical History  Allergies as of 05/01/2025    (No Known Allergies)       Prescriptions Prior to Admission[1]     Medical History[2]    Surgical History[3]     reports that he has quit smoking. His smoking use included cigarettes. He has been exposed to tobacco smoke. He has never used smokeless tobacco. He reports current alcohol use of about 12.0 standard drinks of alcohol per week. He reports current drug use. Frequency: 3.00 times per week. Drug: Marijuana.    Review of Systems  Review of Systems   Constitutional:  Negative for chills, fatigue and fever.   HENT:  Positive for congestion and sore throat. Negative for nosebleeds, postnasal drip, rhinorrhea, sinus pressure and sinus pain.    Respiratory:  Positive for cough, chest tightness and shortness of breath. Negative for wheezing.    Cardiovascular:  Negative for chest pain.   Gastrointestinal:  Negative for diarrhea, nausea and vomiting.   Neurological:  Positive for headaches.   All other systems reviewed and are negative.                                 Objective    Vitals:    05/01/25 1341   BP: 136/75   BP Location: Left arm   Patient Position: Sitting   BP Cuff Size: Large adult   Pulse: 85   Resp: 16   Temp: 36.8 °C (98.3 °F)   TempSrc: Temporal   SpO2: 93%     No LMP for male patient.    Physical Exam  Vitals and nursing note reviewed.   HENT:      Head: Normocephalic.      Right Ear: Ear canal normal.      Left Ear: Ear canal normal.      Nose: Nose normal.      Mouth/Throat:      Lips: Pink.      Mouth: Mucous membranes are moist.      Pharynx: Oropharynx is clear. Uvula midline.   Cardiovascular:      Rate and Rhythm: Normal rate and regular rhythm.      Heart sounds: Normal heart sounds.   Pulmonary:       Effort: Pulmonary effort is normal.      Breath sounds: Examination of the right-upper field reveals wheezing and rhonchi. Examination of the left-upper field reveals wheezing and rhonchi. Examination of the right-middle field reveals wheezing and rhonchi. Examination of the right-lower field reveals wheezing and rhonchi. Examination of the left-lower field reveals wheezing and rhonchi. Wheezing and rhonchi present.   Neurological:      General: No focal deficit present.      Mental Status: He is alert and oriented to person, place, and time.         Procedures    Point of Care Test & Imaging Results from this visit  Results for orders placed or performed in visit on 05/01/25   POCT SPOTFIRE R/ST [facility]OVID [facility] manually resulted    Specimen: Swab   Result Value Ref Range    POC Sars-Cov-2 PCR Negative Negative    POC Respiratory Syncytial Virus PCR Negative Negative    POC Influenza A Virus PCR Negative Negative    POC Influenza B Virus PCR Negative Negative    POC Human Rhinovirus PCR Positive (A) Negative      Imaging  XR chest 2 views  Result Date: 5/1/2025  1. No acute intrathoracic process.     MACRO: None   Signed by: Maryann Abrams 5/1/2025 2:32 PM Dictation workstation:   [id number]      Cardiology, Vascular, and Other Imaging  No other imaging results found for the past 2 days      Diagnostic study results (if any) were reviewed by Crystal L Severino, APRN-CNP.    Assessment/Plan   Allergies, medications, history, and pertinent labs/EKGs/Imaging reviewed by Crystal L Severino, APRN-CNP.     Medical Decision Making  69-year-old male patient with a history of hyperlipidemia, hypertension, pulmonary embolism presents with chief complaint of cough,, headache, chest congestion and shortness of breath x 1 week.  Patient states it was 1 year ago he was diagnosed with pneumonia and also had a blood clot in his lung.  He states he is having some difficulty with an ongoing cough and chest  congestion.  He denies any fevers or chills, sore throat, ear pain or chest pain.  He reports he does not smoke tobacco.  Pulmonary assessment as noted above, pt is showing no signs of acute respiratory distress, speaking in full sentences with a pulse ox of 93% on RA.  Covid spot fire is positive for rhinovirus.  CXR is obtained; negative for pneumonia.  Duoneb is given and patient states he does feel a little better but not much.  Repeat pulse ox is 93%.  He states he is not having the same burning sensation he did when he was diagnosed with the PE last year.  States he takes all of his medications daily including blood thinner.  I explain to patient he will be discharged to home with antibiotic, medrol dose pack and albuterol MDI prescriptions but if his symptoms persist or worsen, he is to go to the ED for further eval including a CT of chest, patient verbalizes understanding.  At time of discharge patient was clinically well-appearing and HDS for outpatient management. The patient and/or family was educated regarding diagnosis, supportive care, OTC and Rx medications. The patient and/or family was given the opportunity to ask questions prior to discharge.  They verbalized understanding of my discussion of the plans for treatment, expected course, indications to return to  or seek further evaluation in ED, and the need for timely follow up as directed.   They were provided with a work/school excuse if requested.        Orders and Diagnoses  Diagnoses and all orders for this visit:  Cough, unspecified type  -     POCT SPOTFIRE R/ST Panel Mini w/COVID ([facility]) manually resulted  -     XR chest 2 views; Future  -     ipratropium-albuteroL (Duo-Neb) 0.5-2.5 mg/3 mL nebulizer solution 3 mL  -     methylPREDNISolone (Medrol Dospak) 4 mg tablets; Follow schedule on package instructions  -     albuterol 90 mcg/actuation inhaler; Inhale 2 puffs every 6 hours if needed for wheezing.  Wheezing  -     XR chest 2  views; Future  -     ipratropium-albuteroL (Duo-Neb) 0.5-2.5 mg/3 mL nebulizer solution 3 mL  -     methylPREDNISolone (Medrol Dospak) 4 mg tablets; Follow schedule on package instructions  -     albuterol 90 mcg/actuation inhaler; Inhale 2 puffs every 6 hours if needed for wheezing.  Bronchitis  -     amoxicillin-clavulanate (Augmentin) 875-125 mg tablet; Take 1 tablet (875 mg) by mouth 2 times a day.  -     methylPREDNISolone (Medrol Dospak) 4 mg tablets; Follow schedule on package instructions  -     albuterol 90 mcg/actuation inhaler; Inhale 2 puffs every 6 hours if needed for wheezing.      Medical Admin Record  Administrations This Visit       ipratropium-albuteroL (Duo-Neb) 0.5-2.5 mg/3 mL nebulizer solution 3 mL       Admin Date  05/01/2025 Action  Given Dose  3 mL Route  nebulization Documented By  Arianna Palacios MA                    Patient disposition: Home    Electronically signed by Crystal L Severino, APRN-CNP  2:48 PM           [1] (Not in a hospital admission)   [2]   Past Medical History:  Diagnosis Date    Hyperlipidemia 02/21/2025   [3] No past surgical history on file.

## 2025-05-01 NOTE — PATIENT INSTRUCTIONS
Tylenol/ibuprofen as needed for pain/discomfort  Rest  Mucinex DM  Wash hands frequently  Increase fluid intake    If your symptoms persist or worsen, you need to go to the ED for further evaluation including a chest CT to rule out pulmonary embolism.

## 2025-05-05 DIAGNOSIS — J40 BRONCHITIS: Primary | ICD-10-CM

## 2025-05-05 RX ORDER — AZITHROMYCIN 500 MG/1
500 TABLET, FILM COATED ORAL DAILY
Qty: 5 TABLET | Refills: 0 | Status: SHIPPED | OUTPATIENT
Start: 2025-05-05 | End: 2025-05-10

## 2025-05-05 RX ORDER — BENZONATATE 200 MG/1
200 CAPSULE ORAL 3 TIMES DAILY PRN
Qty: 30 CAPSULE | Refills: 1 | Status: SHIPPED | OUTPATIENT
Start: 2025-05-05

## 2025-05-05 RX ORDER — BENZONATATE 200 MG/1
200 CAPSULE ORAL 3 TIMES DAILY PRN
Qty: 42 CAPSULE | Refills: 0 | Status: CANCELLED | OUTPATIENT
Start: 2025-05-05 | End: 2025-06-04

## 2025-05-05 NOTE — TELEPHONE ENCOUNTER
He has a cough and it wont go away, asking if you will send in tessalon perles for him.  The wife is afraid it will turn to pneumonia.

## 2025-07-13 ASSESSMENT — ENCOUNTER SYMPTOMS
ENDOCRINE NEGATIVE: 1
SHORTNESS OF BREATH: 0
FEVER: 0
DIZZINESS: 0
DIARRHEA: 0
DIFFICULTY URINATING: 0
NAUSEA: 0

## 2025-07-14 ENCOUNTER — APPOINTMENT (OUTPATIENT)
Dept: PRIMARY CARE | Facility: CLINIC | Age: 70
End: 2025-07-14
Payer: MEDICARE

## 2025-07-14 ENCOUNTER — ANCILLARY PROCEDURE (OUTPATIENT)
Dept: URGENT CARE | Age: 70
End: 2025-07-14
Payer: MEDICARE

## 2025-07-14 ENCOUNTER — OFFICE VISIT (OUTPATIENT)
Dept: URGENT CARE | Age: 70
End: 2025-07-14
Payer: MEDICARE

## 2025-07-14 VITALS
TEMPERATURE: 98.1 F | DIASTOLIC BLOOD PRESSURE: 79 MMHG | OXYGEN SATURATION: 94 % | RESPIRATION RATE: 16 BRPM | HEART RATE: 100 BPM | BODY MASS INDEX: 26.15 KG/M2 | SYSTOLIC BLOOD PRESSURE: 136 MMHG | WEIGHT: 162 LBS

## 2025-07-14 DIAGNOSIS — R06.02 SOB (SHORTNESS OF BREATH): ICD-10-CM

## 2025-07-14 DIAGNOSIS — R05.1 ACUTE COUGH: ICD-10-CM

## 2025-07-14 DIAGNOSIS — R05.1 ACUTE COUGH: Primary | ICD-10-CM

## 2025-07-14 DIAGNOSIS — J40 BRONCHITIS: ICD-10-CM

## 2025-07-14 PROCEDURE — 71046 X-RAY EXAM CHEST 2 VIEWS: CPT | Performed by: REGISTERED NURSE

## 2025-07-14 RX ORDER — IPRATROPIUM BROMIDE AND ALBUTEROL SULFATE 2.5; .5 MG/3ML; MG/3ML
3 SOLUTION RESPIRATORY (INHALATION) ONCE
Status: COMPLETED | OUTPATIENT
Start: 2025-07-14 | End: 2025-07-14

## 2025-07-14 RX ORDER — AMOXICILLIN AND CLAVULANATE POTASSIUM 875; 125 MG/1; MG/1
875 TABLET, FILM COATED ORAL 2 TIMES DAILY
Qty: 20 TABLET | Refills: 0 | Status: SHIPPED | OUTPATIENT
Start: 2025-07-14

## 2025-07-14 RX ORDER — METHYLPREDNISOLONE 4 MG/1
TABLET ORAL
Qty: 21 TABLET | Refills: 0 | Status: SHIPPED | OUTPATIENT
Start: 2025-07-14

## 2025-07-14 RX ADMIN — IPRATROPIUM BROMIDE AND ALBUTEROL SULFATE 3 ML: 2.5; .5 SOLUTION RESPIRATORY (INHALATION) at 18:51

## 2025-07-14 ASSESSMENT — ENCOUNTER SYMPTOMS
CHILLS: 0
HEADACHES: 0
SINUS PAIN: 0
COUGH: 1
VOMITING: 0
WHEEZING: 0
SHORTNESS OF BREATH: 1
NAUSEA: 0
FEVER: 0
RHINORRHEA: 1
FATIGUE: 1
CHEST TIGHTNESS: 1
SORE THROAT: 1
DIARRHEA: 0
SINUS PRESSURE: 0

## 2025-07-14 NOTE — PROGRESS NOTES
Subjective   Patient ID: Arian Iglesias is a 69 y.o. male who presents for chest congestion, cough    HPI   Patient is presenting for chest congestion and cough    Review of Systems   Constitutional:  Negative for fever.        Also see HPI   Eyes:  Negative for visual disturbance.   Respiratory:  Negative for shortness of breath.    Cardiovascular:  Negative for chest pain.   Gastrointestinal:  Negative for diarrhea and nausea.   Endocrine: Negative.    Genitourinary:  Negative for difficulty urinating.   Skin:  Negative for rash.   Neurological:  Negative for dizziness.        No focal deficits   Psychiatric/Behavioral:  Negative for suicidal ideas.    All other systems reviewed and are negative.      Objective   There were no vitals taken for this visit.    Physical Exam  Vitals and nursing note reviewed.   Constitutional:       Appearance: Normal appearance.   HENT:      Head: Normocephalic and atraumatic.   Eyes:      Conjunctiva/sclera: Conjunctivae normal.   Cardiovascular:      Rate and Rhythm: Normal rate and regular rhythm.      Heart sounds: Normal heart sounds.   Pulmonary:      Effort: Pulmonary effort is normal.      Breath sounds: Normal breath sounds.   Neurological:      Mental Status: He is oriented to person, place, and time.   Psychiatric:         Mood and Affect: Mood normal.         Behavior: Behavior normal.         Assessment/Plan   {Assess/PlanSmartLinks:79079}     Scribe Attestation  By signing my name below, I, *** , Scribe   attest that this documentation has been prepared under the direction and in the presence of Vidal Alva DO.

## 2025-07-14 NOTE — PROGRESS NOTES
Subjective   Patient ID: Arian Iglesias is a 69 y.o. male. They present today with a chief complaint of Cough (Chest congestion and wheezing for 5 days).    History of Present Illness  See mdm      History provided by:  Patient      Past Medical History  Allergies as of 07/14/2025    (No Known Allergies)       Prescriptions Prior to Admission[1]     Medical History[2]    Surgical History[3]     reports that he has quit smoking. His smoking use included cigarettes. He has been exposed to tobacco smoke. He has never used smokeless tobacco. He reports current alcohol use of about 12.0 standard drinks of alcohol per week. He reports current drug use. Frequency: 3.00 times per week. Drug: Marijuana.    Review of Systems  Review of Systems   Constitutional:  Positive for fatigue. Negative for chills and fever.   HENT:  Positive for congestion, nosebleeds, postnasal drip, rhinorrhea and sore throat. Negative for sinus pressure and sinus pain.    Respiratory:  Positive for cough, chest tightness and shortness of breath. Negative for wheezing.    Cardiovascular:  Negative for chest pain.   Gastrointestinal:  Negative for diarrhea, nausea and vomiting.   Neurological:  Negative for headaches.   All other systems reviewed and are negative.                                 Objective    Vitals:    07/14/25 1823   BP: 136/79   BP Location: Left arm   Patient Position: Sitting   BP Cuff Size: Adult   Pulse: 100   Resp: 16   Temp: 36.7 °C (98.1 °F)   TempSrc: Oral   SpO2: 94%   Weight: 73.5 kg (162 lb)     No LMP for male patient.    Physical Exam  Vitals and nursing note reviewed.   HENT:      Head: Normocephalic.      Right Ear: Ear canal normal.      Left Ear: Ear canal normal.      Nose: Mucosal edema, congestion and rhinorrhea present. Rhinorrhea is clear and bloody.      Mouth/Throat:      Lips: Pink.      Mouth: Mucous membranes are moist.      Pharynx: Oropharynx is clear. Uvula midline.   Cardiovascular:      Rate and  Rhythm: Normal rate and regular rhythm.      Heart sounds: Normal heart sounds.   Pulmonary:      Effort: Pulmonary effort is normal.      Breath sounds: Examination of the right-upper field reveals decreased breath sounds and rhonchi. Examination of the left-upper field reveals decreased breath sounds and rhonchi. Examination of the right-middle field reveals decreased breath sounds and rhonchi. Examination of the right-lower field reveals decreased breath sounds and rhonchi. Examination of the left-lower field reveals decreased breath sounds and rhonchi. Decreased breath sounds and rhonchi present.   Neurological:      General: No focal deficit present.      Mental Status: He is alert and oriented to person, place, and time.         Procedures    Point of Care Test & Imaging Results from this visit  No results found for this visit on 07/14/25.   Imaging  No results found.    Cardiology, Vascular, and Other Imaging  No other imaging results found for the past 2 days      Diagnostic study results (if any) were reviewed by Crystal L Severino, APRN-CNP.    Assessment/Plan   Allergies, medications, history, and pertinent labs/EKGs/Imaging reviewed by Crystal L Severino, APRN-CNP.     Medical Decision Making  69-year-old male patient with a history of hypertension, hyperlipidemia, and previous history of pneumonia presents accompanied by his wife for complaints of cough with green/yellow mucus production, chest congestion and wheezing x 5 days.  He states he is having some shortness of breath on exertion.  He denies any fevers or chills, chest pain, sinus pain or pressure.  Pulmonary assessment as noted above, pt is showing no signs of acute respiratory distress, speaking in full sentences with a pulse ox of 94% on RA, CXR is obtained; negative for pneumonia.  Patient will be treated with antibiotics and medrol dose pack for bronchitis.  At time of discharge patient was clinically well-appearing and HDS for outpatient  management. The patient and/or family was educated regarding diagnosis, supportive care, OTC and Rx medications. The patient and/or family was given the opportunity to ask questions prior to discharge.  They verbalized understanding of my discussion of the plans for treatment, expected course, indications to return to  or seek further evaluation in ED, and the need for timely follow up as directed.   They were provided with a work/school excuse if requested.        Orders and Diagnoses  Diagnoses and all orders for this visit:  Acute cough  -     XR chest 2 views; Future  -     ipratropium-albuteroL (Duo-Neb) 0.5-2.5 mg/3 mL nebulizer solution 3 mL  SOB (shortness of breath)  -     XR chest 2 views; Future  -     ipratropium-albuteroL (Duo-Neb) 0.5-2.5 mg/3 mL nebulizer solution 3 mL  Tylenol/ibuprofen as needed for pain/discomfort  Rest  Mucinex DM  Wash hands frequently  Increase fluid intake      Medical Admin Record      Patient disposition: Home    Electronically signed by Crystal L Severino, APRN-CNP  6:42 PM           [1] (Not in a hospital admission)  [2]   Past Medical History:  Diagnosis Date    Hyperlipidemia 02/21/2025   [3] No past surgical history on file.

## 2025-08-06 ENCOUNTER — HOSPITAL ENCOUNTER (EMERGENCY)
Facility: HOSPITAL | Age: 70
Discharge: HOME | End: 2025-08-06
Payer: MEDICARE

## 2025-08-06 ENCOUNTER — APPOINTMENT (OUTPATIENT)
Dept: RADIOLOGY | Facility: HOSPITAL | Age: 70
End: 2025-08-06
Payer: MEDICARE

## 2025-08-06 ENCOUNTER — APPOINTMENT (OUTPATIENT)
Dept: CARDIOLOGY | Facility: HOSPITAL | Age: 70
End: 2025-08-06
Payer: MEDICARE

## 2025-08-06 ENCOUNTER — OFFICE VISIT (OUTPATIENT)
Dept: PRIMARY CARE | Facility: CLINIC | Age: 70
End: 2025-08-06
Payer: MEDICARE

## 2025-08-06 VITALS
OXYGEN SATURATION: 93 % | SYSTOLIC BLOOD PRESSURE: 130 MMHG | BODY MASS INDEX: 26.42 KG/M2 | HEIGHT: 66 IN | WEIGHT: 164.4 LBS | DIASTOLIC BLOOD PRESSURE: 80 MMHG | HEART RATE: 88 BPM | TEMPERATURE: 98.2 F

## 2025-08-06 VITALS
DIASTOLIC BLOOD PRESSURE: 93 MMHG | OXYGEN SATURATION: 93 % | HEIGHT: 66 IN | BODY MASS INDEX: 26.33 KG/M2 | SYSTOLIC BLOOD PRESSURE: 134 MMHG | TEMPERATURE: 98.6 F | WEIGHT: 163.8 LBS | RESPIRATION RATE: 21 BRPM | HEART RATE: 91 BPM

## 2025-08-06 DIAGNOSIS — R06.02 SHORTNESS OF BREATH: Primary | ICD-10-CM

## 2025-08-06 DIAGNOSIS — J44.1 COPD EXACERBATION (MULTI): ICD-10-CM

## 2025-08-06 LAB
ANION GAP SERPL CALCULATED.3IONS-SCNC: 13 MMOL/L (ref 10–20)
APPEARANCE UR: CLEAR
BASOPHILS # BLD AUTO: 0.08 X10*3/UL (ref 0–0.1)
BASOPHILS NFR BLD AUTO: 1.1 %
BILIRUB UR STRIP.AUTO-MCNC: NEGATIVE MG/DL
BNP SERPL-MCNC: 16 PG/ML (ref 0–99)
BUN SERPL-MCNC: 9 MG/DL (ref 6–23)
CALCIUM SERPL-MCNC: 9.8 MG/DL (ref 8.6–10.3)
CARDIAC TROPONIN I PNL SERPL HS: 7 NG/L (ref 0–20)
CARDIAC TROPONIN I PNL SERPL HS: 8 NG/L (ref 0–20)
CHLORIDE SERPL-SCNC: 104 MMOL/L (ref 98–107)
CO2 SERPL-SCNC: 27 MMOL/L (ref 21–32)
COLOR UR: YELLOW
CREAT SERPL-MCNC: 0.93 MG/DL (ref 0.5–1.3)
EGFRCR SERPLBLD CKD-EPI 2021: 89 ML/MIN/1.73M*2
EOSINOPHIL # BLD AUTO: 1.08 X10*3/UL (ref 0–0.7)
EOSINOPHIL NFR BLD AUTO: 15 %
ERYTHROCYTE [DISTWIDTH] IN BLOOD BY AUTOMATED COUNT: 12.9 % (ref 11.5–14.5)
FLUAV RNA RESP QL NAA+PROBE: NOT DETECTED
FLUBV RNA RESP QL NAA+PROBE: NOT DETECTED
GLUCOSE SERPL-MCNC: 97 MG/DL (ref 74–99)
GLUCOSE UR STRIP.AUTO-MCNC: NORMAL MG/DL
HCT VFR BLD AUTO: 43.8 % (ref 41–52)
HGB BLD-MCNC: 15 G/DL (ref 13.5–17.5)
IMM GRANULOCYTES # BLD AUTO: 0.01 X10*3/UL (ref 0–0.7)
IMM GRANULOCYTES NFR BLD AUTO: 0.1 % (ref 0–0.9)
KETONES UR STRIP.AUTO-MCNC: NEGATIVE MG/DL
LEUKOCYTE ESTERASE UR QL STRIP.AUTO: NEGATIVE
LYMPHOCYTES # BLD AUTO: 1.61 X10*3/UL (ref 1.2–4.8)
LYMPHOCYTES NFR BLD AUTO: 22.3 %
MAGNESIUM SERPL-MCNC: 2.03 MG/DL (ref 1.6–2.4)
MCH RBC QN AUTO: 31.9 PG (ref 26–34)
MCHC RBC AUTO-ENTMCNC: 34.2 G/DL (ref 32–36)
MCV RBC AUTO: 93 FL (ref 80–100)
MONOCYTES # BLD AUTO: 0.62 X10*3/UL (ref 0.1–1)
MONOCYTES NFR BLD AUTO: 8.6 %
NEUTROPHILS # BLD AUTO: 3.82 X10*3/UL (ref 1.2–7.7)
NEUTROPHILS NFR BLD AUTO: 52.9 %
NITRITE UR QL STRIP.AUTO: NEGATIVE
NRBC BLD-RTO: 0 /100 WBCS (ref 0–0)
PH UR STRIP.AUTO: 6 [PH]
PLATELET # BLD AUTO: 228 X10*3/UL (ref 150–450)
POTASSIUM SERPL-SCNC: 3.8 MMOL/L (ref 3.5–5.3)
PROT UR STRIP.AUTO-MCNC: NEGATIVE MG/DL
RBC # BLD AUTO: 4.7 X10*6/UL (ref 4.5–5.9)
RBC # UR STRIP.AUTO: NEGATIVE MG/DL
RSV RNA RESP QL NAA+PROBE: NOT DETECTED
SARS-COV-2 RNA RESP QL NAA+PROBE: NOT DETECTED
SODIUM SERPL-SCNC: 140 MMOL/L (ref 136–145)
SP GR UR STRIP.AUTO: >1.05
UROBILINOGEN UR STRIP.AUTO-MCNC: NORMAL MG/DL
WBC # BLD AUTO: 7.2 X10*3/UL (ref 4.4–11.3)

## 2025-08-06 PROCEDURE — 84484 ASSAY OF TROPONIN QUANT: CPT

## 2025-08-06 PROCEDURE — 96375 TX/PRO/DX INJ NEW DRUG ADDON: CPT | Mod: 59

## 2025-08-06 PROCEDURE — 2500000001 HC RX 250 WO HCPCS SELF ADMINISTERED DRUGS (ALT 637 FOR MEDICARE OP): Performed by: PHYSICIAN ASSISTANT

## 2025-08-06 PROCEDURE — 3075F SYST BP GE 130 - 139MM HG: CPT | Performed by: FAMILY MEDICINE

## 2025-08-06 PROCEDURE — 2500000004 HC RX 250 GENERAL PHARMACY W/ HCPCS (ALT 636 FOR OP/ED): Mod: JZ | Performed by: PHYSICIAN ASSISTANT

## 2025-08-06 PROCEDURE — 87637 SARSCOV2&INF A&B&RSV AMP PRB: CPT

## 2025-08-06 PROCEDURE — 1125F AMNT PAIN NOTED PAIN PRSNT: CPT | Performed by: FAMILY MEDICINE

## 2025-08-06 PROCEDURE — 71275 CT ANGIOGRAPHY CHEST: CPT

## 2025-08-06 PROCEDURE — 80048 BASIC METABOLIC PNL TOTAL CA: CPT

## 2025-08-06 PROCEDURE — 3079F DIAST BP 80-89 MM HG: CPT | Performed by: FAMILY MEDICINE

## 2025-08-06 PROCEDURE — 71046 X-RAY EXAM CHEST 2 VIEWS: CPT | Mod: FOREIGN READ | Performed by: RADIOLOGY

## 2025-08-06 PROCEDURE — 96376 TX/PRO/DX INJ SAME DRUG ADON: CPT | Mod: 59

## 2025-08-06 PROCEDURE — 85025 COMPLETE CBC W/AUTO DIFF WBC: CPT

## 2025-08-06 PROCEDURE — 2500000002 HC RX 250 W HCPCS SELF ADMINISTERED DRUGS (ALT 637 FOR MEDICARE OP, ALT 636 FOR OP/ED)

## 2025-08-06 PROCEDURE — 94640 AIRWAY INHALATION TREATMENT: CPT

## 2025-08-06 PROCEDURE — 36415 COLL VENOUS BLD VENIPUNCTURE: CPT

## 2025-08-06 PROCEDURE — 99213 OFFICE O/P EST LOW 20 MIN: CPT | Performed by: FAMILY MEDICINE

## 2025-08-06 PROCEDURE — 81003 URINALYSIS AUTO W/O SCOPE: CPT | Performed by: PHYSICIAN ASSISTANT

## 2025-08-06 PROCEDURE — 2550000001 HC RX 255 CONTRASTS: Performed by: PHYSICIAN ASSISTANT

## 2025-08-06 PROCEDURE — 83880 ASSAY OF NATRIURETIC PEPTIDE: CPT

## 2025-08-06 PROCEDURE — 99285 EMERGENCY DEPT VISIT HI MDM: CPT | Mod: 25

## 2025-08-06 PROCEDURE — 71046 X-RAY EXAM CHEST 2 VIEWS: CPT

## 2025-08-06 PROCEDURE — 3008F BODY MASS INDEX DOCD: CPT | Performed by: FAMILY MEDICINE

## 2025-08-06 PROCEDURE — 1159F MED LIST DOCD IN RCRD: CPT | Performed by: FAMILY MEDICINE

## 2025-08-06 PROCEDURE — 96374 THER/PROPH/DIAG INJ IV PUSH: CPT | Mod: 59

## 2025-08-06 PROCEDURE — 2500000002 HC RX 250 W HCPCS SELF ADMINISTERED DRUGS (ALT 637 FOR MEDICARE OP, ALT 636 FOR OP/ED): Performed by: PHYSICIAN ASSISTANT

## 2025-08-06 PROCEDURE — 93005 ELECTROCARDIOGRAM TRACING: CPT

## 2025-08-06 PROCEDURE — 2500000004 HC RX 250 GENERAL PHARMACY W/ HCPCS (ALT 636 FOR OP/ED): Mod: JZ

## 2025-08-06 PROCEDURE — 83735 ASSAY OF MAGNESIUM: CPT

## 2025-08-06 PROCEDURE — 71275 CT ANGIOGRAPHY CHEST: CPT | Mod: FOREIGN READ | Performed by: RADIOLOGY

## 2025-08-06 RX ORDER — IPRATROPIUM BROMIDE AND ALBUTEROL SULFATE 2.5; .5 MG/3ML; MG/3ML
3 SOLUTION RESPIRATORY (INHALATION) ONCE
Status: COMPLETED | OUTPATIENT
Start: 2025-08-06 | End: 2025-08-06

## 2025-08-06 RX ORDER — HYDROCODONE BITARTRATE AND HOMATROPINE METHYLBROMIDE ORAL SOLUTION 5; 1.5 MG/5ML; MG/5ML
5 LIQUID ORAL EVERY 6 HOURS PRN
Qty: 100 ML | Refills: 0 | Status: SHIPPED | OUTPATIENT
Start: 2025-08-06 | End: 2025-08-11

## 2025-08-06 RX ORDER — IPRATROPIUM BROMIDE AND ALBUTEROL SULFATE 2.5; .5 MG/3ML; MG/3ML
3 SOLUTION RESPIRATORY (INHALATION)
Qty: 180 ML | Refills: 0 | Status: SHIPPED | OUTPATIENT
Start: 2025-08-06 | End: 2025-08-21

## 2025-08-06 RX ORDER — HYDROCODONE BITARTRATE AND HOMATROPINE METHYLBROMIDE ORAL SOLUTION 5; 1.5 MG/5ML; MG/5ML
5 LIQUID ORAL ONCE
Refills: 0 | Status: COMPLETED | OUTPATIENT
Start: 2025-08-06 | End: 2025-08-06

## 2025-08-06 RX ORDER — HYDROCODONE BITARTRATE AND HOMATROPINE METHYLBROMIDE ORAL SOLUTION 5; 1.5 MG/5ML; MG/5ML
5 LIQUID ORAL EVERY 6 HOURS PRN
Qty: 100 ML | Refills: 0 | Status: SHIPPED | OUTPATIENT
Start: 2025-08-06 | End: 2025-08-06

## 2025-08-06 RX ORDER — ALBUTEROL SULFATE 90 UG/1
2 INHALANT RESPIRATORY (INHALATION) EVERY 4 HOURS PRN
Qty: 18 G | Refills: 0 | Status: SHIPPED | OUTPATIENT
Start: 2025-08-06 | End: 2025-09-05

## 2025-08-06 RX ORDER — METHYLPREDNISOLONE 4 MG/1
TABLET ORAL
Qty: 21 TABLET | Refills: 0 | Status: SHIPPED | OUTPATIENT
Start: 2025-08-06 | End: 2025-08-06

## 2025-08-06 RX ORDER — DOXYCYCLINE 100 MG/1
100 CAPSULE ORAL 2 TIMES DAILY
Qty: 20 CAPSULE | Refills: 0 | Status: SHIPPED | OUTPATIENT
Start: 2025-08-06 | End: 2025-08-06

## 2025-08-06 RX ORDER — ALBUTEROL SULFATE 90 UG/1
2 INHALANT RESPIRATORY (INHALATION) EVERY 4 HOURS PRN
Qty: 18 G | Refills: 0 | Status: SHIPPED | OUTPATIENT
Start: 2025-08-06 | End: 2025-08-06

## 2025-08-06 RX ORDER — DOXYCYCLINE 100 MG/1
100 CAPSULE ORAL 2 TIMES DAILY
Qty: 20 CAPSULE | Refills: 0 | Status: SHIPPED | OUTPATIENT
Start: 2025-08-06 | End: 2025-08-16

## 2025-08-06 RX ORDER — METHYLPREDNISOLONE 4 MG/1
TABLET ORAL
Qty: 21 TABLET | Refills: 0 | Status: SHIPPED | OUTPATIENT
Start: 2025-08-06 | End: 2025-08-13

## 2025-08-06 RX ORDER — KETOROLAC TROMETHAMINE 15 MG/ML
15 INJECTION, SOLUTION INTRAMUSCULAR; INTRAVENOUS ONCE
Status: COMPLETED | OUTPATIENT
Start: 2025-08-06 | End: 2025-08-06

## 2025-08-06 RX ORDER — IPRATROPIUM BROMIDE AND ALBUTEROL SULFATE 2.5; .5 MG/3ML; MG/3ML
3 SOLUTION RESPIRATORY (INHALATION)
Qty: 180 ML | Refills: 0 | Status: SHIPPED | OUTPATIENT
Start: 2025-08-06 | End: 2025-08-06

## 2025-08-06 RX ADMIN — IOHEXOL 75 ML: 350 INJECTION, SOLUTION INTRAVENOUS at 17:39

## 2025-08-06 RX ADMIN — HYDROCODONE BITARTRATE AND HOMATROPINE METHYLBROMIDE 5 ML: 5; 1.5 SOLUTION ORAL at 19:03

## 2025-08-06 RX ADMIN — IPRATROPIUM BROMIDE AND ALBUTEROL SULFATE 3 ML: 2.5; .5 SOLUTION RESPIRATORY (INHALATION) at 17:41

## 2025-08-06 RX ADMIN — IPRATROPIUM BROMIDE AND ALBUTEROL SULFATE 3 ML: 2.5; .5 SOLUTION RESPIRATORY (INHALATION) at 20:03

## 2025-08-06 RX ADMIN — METHYLPREDNISOLONE SODIUM SUCCINATE 125 MG: 125 INJECTION, POWDER, FOR SOLUTION INTRAMUSCULAR; INTRAVENOUS at 15:22

## 2025-08-06 RX ADMIN — KETOROLAC TROMETHAMINE 15 MG: 15 INJECTION, SOLUTION INTRAMUSCULAR; INTRAVENOUS at 15:25

## 2025-08-06 RX ADMIN — METHYLPREDNISOLONE SODIUM SUCCINATE 125 MG: 125 INJECTION, POWDER, FOR SOLUTION INTRAMUSCULAR; INTRAVENOUS at 17:41

## 2025-08-06 RX ADMIN — IPRATROPIUM BROMIDE AND ALBUTEROL SULFATE 3 ML: 2.5; .5 SOLUTION RESPIRATORY (INHALATION) at 15:27

## 2025-08-06 RX ADMIN — IPRATROPIUM BROMIDE AND ALBUTEROL SULFATE 3 ML: 2.5; .5 SOLUTION RESPIRATORY (INHALATION) at 17:39

## 2025-08-06 ASSESSMENT — PAIN SCALES - GENERAL
PAINLEVEL_OUTOF10: 8
PAINLEVEL_OUTOF10: 6
PAINLEVEL_OUTOF10: 7
PAINLEVEL_OUTOF10: 0 - NO PAIN

## 2025-08-06 ASSESSMENT — PAIN - FUNCTIONAL ASSESSMENT
PAIN_FUNCTIONAL_ASSESSMENT: 0-10
PAIN_FUNCTIONAL_ASSESSMENT: 0-10

## 2025-08-06 ASSESSMENT — PAIN DESCRIPTION - LOCATION: LOCATION: CHEST

## 2025-08-06 NOTE — ED PROVIDER NOTES
HPI   Chief Complaint   Patient presents with    Shortness of Breath     Pt complains of sob cough and chest congestion.  Was sent by pcp for xray.        69-year-old male presented emergency department with a chief complaint of shortness of breath, cough, congestion.  Former smoker.  Seen at primary office today referred to the emergency department.  Borderline hypoxic on arrival.  Does not have formal COPD diagnosis.  Appearing nontoxic afebrile.  Denies any other injury or trauma.  No other complaint.              Patient History   Medical History[1]  Surgical History[2]  Family History[3]  Social History[4]    Physical Exam   ED Triage Vitals [08/06/25 1418]   Temperature Heart Rate Respirations BP   37 °C (98.6 °F) 84 18 135/79      Pulse Ox Temp src Heart Rate Source Patient Position   (!) 92 % -- -- --      BP Location FiO2 (%)     -- --       Physical Exam  Vitals and nursing note reviewed.   Constitutional:       Appearance: He is well-developed.   HENT:      Head: Normocephalic.     Cardiovascular:      Rate and Rhythm: Normal rate and regular rhythm.   Pulmonary:      Comments: Diffuse inspiratory expiratory wheezing  Abdominal:      Palpations: Abdomen is soft.     Musculoskeletal:         General: Normal range of motion.      Cervical back: Normal range of motion.      Right lower leg: No tenderness.      Left lower leg: No tenderness.     Skin:     General: Skin is warm.     Neurological:      General: No focal deficit present.      Mental Status: He is alert and oriented to person, place, and time.     Psychiatric:         Mood and Affect: Mood normal.           ED Course & MDM   Diagnoses as of 08/06/25 2109   Shortness of breath   COPD exacerbation (Multi)                 No data recorded     Chanda Coma Scale Score: 15 (08/06/25 1457 : Suzan Ricardo RN)                           Medical Decision Making  I have seen and evaluated this patient.  Physician available for consultation.  Vital signs  have been reviewed.  All laboratory and diagnostic imaging is reviewed by myself and interpreted by myself unless otherwise stated.  Additionally imaging is interpreted by radiologist.    CBC without significant leukocytosis or anemia, metabolic panel without significant renal impairment or electrolyte abnormality.  Troponins negative, BNP negative.  CT angio without pulmonary embolism or pneumonia.  Patient significantly proved after DuoNeb and steroid in emergency department.  Walking pulse ox is in appropriate range.  Given pulmonology referral, outpatient steroid albuterol.  Cough suppressant.  Will cover for potential superimposed infection.  Released in stable condition from emergent standpoint.    Labs Reviewed  CBC WITH AUTO DIFFERENTIAL - Abnormal     WBC                           7.2                    nRBC                          0.0                    RBC                           4.70                   Hemoglobin                    15.0                   Hematocrit                    43.8                   MCV                           93                     MCH                           31.9                   MCHC                          34.2                   RDW                           12.9                   Platelets                     228                    Neutrophils %                 52.9                   Immature Granulocytes %, Automated   0.1                    Lymphocytes %                 22.3                   Monocytes %                   8.6                    Eosinophils %                 15.0                   Basophils %                   1.1                    Neutrophils Absolute          3.82                   Immature Granulocytes Absolute, Au*   0.01                   Lymphocytes Absolute          1.61                   Monocytes Absolute            0.62                   Eosinophils Absolute          1.08 (*)               Basophils Absolute            0.08                 URINALYSIS WITH REFLEX CULTURE AND MICROSCOPIC - Abnormal     Color, Urine                  Yellow                 Appearance, Urine             Clear                  Specific Gravity, Urine       >1.050 (*)               pH, Urine                     6.0                    Protein, Urine                NEGATIVE                Glucose, Urine                Normal                 Blood, Urine                  NEGATIVE                Ketones, Urine                NEGATIVE                Bilirubin, Urine              NEGATIVE                Urobilinogen, Urine           Normal                 Nitrite, Urine                NEGATIVE                Leukocyte Esterase, Urine     NEGATIVE             BASIC METABOLIC PANEL - Normal     Glucose                       97                     Sodium                        140                    Potassium                     3.8                    Chloride                      104                    Bicarbonate                   27                     Anion Gap                     13                     Urea Nitrogen                 9                      Creatinine                    0.93                   eGFR                          89                     Calcium                       9.8                 MAGNESIUM - Normal     Magnesium                     2.03                B-TYPE NATRIURETIC PEPTIDE - Normal     BNP                           16                       Narrative:    <100 pg/mL - Heart failure unlikely                100-299 pg/mL - Intermediate probability of acute heart                                failure exacerbation. Correlate with clinical                                context and patient history.                  >=300 pg/mL - Heart Failure likely. Correlate with clinical                                context and patient history.                                BNP testing is performed using different testing methodology at [facility]  than at other [facility] hospitals. Direct result comparisons should only be made within the same method.                   SARS-COV-2, INFLUENZA A/B AND RSV PCR - Normal     Coronavirus 2019, PCR                                Flu A Result                                         Flu B Result                                         RSV PCR                                                Narrative: This assay is an FDA-cleared, in vitro diagnostic nucleic acid amplification test for the qualitative detection and differentiation of SARS CoV-2/ Influenza A/B/ RSV from nasopharyngeal specimens collected from individuals with signs and symptoms of respiratory tract infections, and has been validated for use at [facility]. Negative results do not preclude COVID-19/ Influenza A/B/ RSV infections and should not be used as the sole basis for diagnosis, treatment, or other management decisions. Testing for SARS CoV-2 is recommended only for patients who meet current clinical and/or epidemiological criteria defined by federal, state, or local public health directives.  SERIAL TROPONIN-INITIAL - Normal     Troponin I, High Sensitivity   7                        Narrative: Less than 99th percentile of normal range cutoff-                Female and children under 18 years old <14 ng/L; Male <21 ng/L: Negative                Repeat testing should be performed if clinically indicated.                                 Female and children under 18 years old 14-50 ng/L; Male 21-50 ng/L:                Consistent with possible cardiac damage and possible increased clinical                 risk. Serial measurements may help to assess extent of myocardial damage.                                 >50 ng/L: Consistent with cardiac damage, increased clinical risk and                myocardial infarction. Serial measurements may help assess extent of                 myocardial damage.                                  NOTE:  Children less than 1 year old may have higher baseline troponin                 levels and results should be interpreted in conjunction with the overall                 clinical context.                                 NOTE: Troponin I testing is performed using a different                 testing methodology at [facility] than at other                 [facility]. Direct result comparisons should only                 be made within the same method.  SERIAL TROPONIN, 1 HOUR - Normal     Troponin I, High Sensitivity   8                        Narrative: Less than 99th percentile of normal range cutoff-                Female and children under 18 years old <14 ng/L; Male <21 ng/L: Negative                Repeat testing should be performed if clinically indicated.                                 Female and children under 18 years old 14-50 ng/L; Male 21-50 ng/L:                Consistent with possible cardiac damage and possible increased clinical                 risk. Serial measurements may help to assess extent of myocardial damage.                                 >50 ng/L: Consistent with cardiac damage, increased clinical risk and                myocardial infarction. Serial measurements may help assess extent of                 myocardial damage.                                  NOTE: Children less than 1 year old may have higher baseline troponin                 levels and results should be interpreted in conjunction with the overall                 clinical context.                                 NOTE: Troponin I testing is performed using a different                 testing methodology at [facility] than at other                 [facility]. Direct result comparisons should only                 be made within the same method.  TROPONIN SERIES- (INITIAL, 1 HR)       Narrative: The following orders were created for panel order Troponin I Series, High Sensitivity (0, 1 HR).                 Procedure                               Abnormality         Status                                   ---------                               -----------         ------                                   Troponin I, High Sensiti...[[id number]]  Normal              Final result                             Troponin, High Sensitivi...[[id number]]  Normal              Final result                                             Please view results for these tests on the individual orders.  URINALYSIS WITH REFLEX CULTURE AND MICROSCOPIC       Narrative: The following orders were created for panel order Urinalysis with Reflex Culture and Microscopic.                Procedure                               Abnormality         Status                                   ---------                               -----------         ------                                   Urinalysis with Reflex C...[[id number]]  Abnormal            Final result                             Extra Urine Gray Tube[[id number]]                                                                                     Please view results for these tests on the individual orders.  EXTRA URINE GRAY TUBE  CT angio chest for pulmonary embolism   Final Result    No pulmonary artery emboli.    Bronchiectasis and bronchial wall thickening suggesting acute    bronchitis..    Mildly prominent right paratracheal and right hilar lymph nodes which    are likely reactive in nature    Stable ascending aortic aneurysm.    Fatty liver morphology.  Questionable cirrhotic changes    Signed by Nhan Wright,      XR chest 2 views   Final Result    No acute process.    Signed by Chapin Clemons MD     Medications  ketorolac (Toradol) injection 15 mg (15 mg intravenous Given 8/6/25 1525)  ipratropium-albuteroL (Duo-Neb) 0.5-2.5 mg/3 mL nebulizer solution 3 mL (3 mL nebulization Given 8/6/25 1527)  methylPREDNISolone sod succinate (SOLU-Medrol) injection 125 mg (125 mg  intravenous Given 8/6/25 1522)  ipratropium-albuteroL (Duo-Neb) 0.5-2.5 mg/3 mL nebulizer solution 3 mL (3 mL nebulization Given 8/6/25 1739)  ipratropium-albuteroL (Duo-Neb) 0.5-2.5 mg/3 mL nebulizer solution 3 mL (3 mL nebulization Given 8/6/25 1741)  methylPREDNISolone sod succinate (SOLU-Medrol) injection 125 mg (125 mg intravenous Given 8/6/25 1741)  iohexol (OMNIPaque) 350 mg iodine/mL solution 75 mL (75 mL intravenous Given 8/6/25 1739)  hydrocodone-homatropine (Hycodan) 5-1.5 mg/5 mL syrup 5 mL (5 mL oral Given 8/6/25 1903)  ipratropium-albuteroL (Duo-Neb) 0.5-2.5 mg/3 mL nebulizer solution 3 mL (3 mL nebulization Given 8/6/25 2003)  New Prescriptions  ALBUTEROL 90 MCG/ACTUATION INHALER     Inhale 2 puffs every 4 hours if needed for wheezing.  DOXYCYCLINE (VIBRAMYCIN) 100 MG CAPSULE     Take 1 capsule (100 mg) by mouth 2 times a day for 10 days. Take with at least 8 ounces (large glass) of water, do not lie down for 30 minutes after  HYDROCODONE-HOMATROPINE (HYCODAN) 5-1.5 MG/5 ML SYRUP     Take 5 mL by mouth every 6 hours if needed for cough for up to 5 days.  IPRATROPIUM-ALBUTEROL (DUO-NEB) 0.5-2.5 MG/3 ML NEBULIZER SOLUTION     Take 3 mL by nebulization every 6 hours for 15 days.  METHYLPREDNISOLONE (MEDROL DOSPAK) 4 MG TABLETS     Follow schedule on package instructions            Procedure  Procedures       [1]   Past Medical History:  Diagnosis Date    Hyperlipidemia 02/21/2025   [2] No past surgical history on file.  [3]   Family History  Problem Relation Name Age of Onset    Heart disease Mother      Heart disease Father      No Known Problems Sister      Heart attack Brother      No Known Problems Daughter      No Known Problems Daughter      No Known Problems Daughter      Heart disease Maternal Grandfather     [4]   Social History  Tobacco Use    Smoking status: Former     Types: Cigarettes     Passive exposure: Past    Smokeless tobacco: Never   Vaping Use    Vaping status: Never Used   Substance  Use Topics    Alcohol use: Not Currently    Drug use: Not Currently     Frequency: 3.0 times per week     Types: Marijuana        Zackery Betancourt PA-C  08/06/25 0670

## 2025-08-07 LAB
ATRIAL RATE: 81 BPM
P AXIS: 56 DEGREES
P OFFSET: 198 MS
P ONSET: 144 MS
PR INTERVAL: 164 MS
Q ONSET: 226 MS
QRS COUNT: 14 BEATS
QRS DURATION: 88 MS
QT INTERVAL: 364 MS
QTC CALCULATION(BAZETT): 422 MS
QTC FREDERICIA: 402 MS
R AXIS: 27 DEGREES
T AXIS: 57 DEGREES
T OFFSET: 408 MS
VENTRICULAR RATE: 81 BPM

## 2025-08-19 ENCOUNTER — OFFICE VISIT (OUTPATIENT)
Dept: PULMONOLOGY | Facility: CLINIC | Age: 70
End: 2025-08-19
Payer: MEDICARE

## 2025-08-19 VITALS
DIASTOLIC BLOOD PRESSURE: 61 MMHG | HEIGHT: 66 IN | BODY MASS INDEX: 26.1 KG/M2 | HEART RATE: 77 BPM | OXYGEN SATURATION: 95 % | WEIGHT: 162.4 LBS | SYSTOLIC BLOOD PRESSURE: 110 MMHG | TEMPERATURE: 97.5 F

## 2025-08-19 DIAGNOSIS — J44.1 COPD EXACERBATION (MULTI): Primary | ICD-10-CM

## 2025-08-19 DIAGNOSIS — J44.9 SUSPECTED CHRONIC OBSTRUCTIVE PULMONARY DISEASE BASED ON INITIAL EVALUATION (MULTI): ICD-10-CM

## 2025-08-19 DIAGNOSIS — R05.1 ACUTE COUGH: ICD-10-CM

## 2025-08-19 PROCEDURE — 3078F DIAST BP <80 MM HG: CPT | Performed by: INTERNAL MEDICINE

## 2025-08-19 PROCEDURE — 3074F SYST BP LT 130 MM HG: CPT | Performed by: INTERNAL MEDICINE

## 2025-08-19 PROCEDURE — 1159F MED LIST DOCD IN RCRD: CPT | Performed by: INTERNAL MEDICINE

## 2025-08-19 PROCEDURE — 1160F RVW MEDS BY RX/DR IN RCRD: CPT | Performed by: INTERNAL MEDICINE

## 2025-08-19 PROCEDURE — 99205 OFFICE O/P NEW HI 60 MIN: CPT | Performed by: INTERNAL MEDICINE

## 2025-08-19 PROCEDURE — 99212 OFFICE O/P EST SF 10 MIN: CPT | Performed by: INTERNAL MEDICINE

## 2025-08-19 PROCEDURE — 3008F BODY MASS INDEX DOCD: CPT | Performed by: INTERNAL MEDICINE

## 2025-08-19 RX ORDER — ALBUTEROL SULFATE 90 UG/1
1 INHALANT RESPIRATORY (INHALATION) ONCE
OUTPATIENT
Start: 2025-08-19

## 2025-08-19 RX ORDER — PREDNISONE 10 MG/1
TABLET ORAL
Qty: 30 TABLET | Refills: 0 | Status: SHIPPED | OUTPATIENT
Start: 2025-08-19 | End: 2025-09-18

## 2025-08-19 RX ORDER — ALBUTEROL SULFATE 0.83 MG/ML
3 SOLUTION RESPIRATORY (INHALATION) ONCE
OUTPATIENT
Start: 2025-08-19 | End: 2025-08-19

## 2025-08-19 RX ORDER — FLUTICASONE FUROATE, UMECLIDINIUM BROMIDE AND VILANTEROL TRIFENATATE 100; 62.5; 25 UG/1; UG/1; UG/1
1 POWDER RESPIRATORY (INHALATION) DAILY
Qty: 60 EACH | Refills: 5 | Status: SHIPPED | OUTPATIENT
Start: 2025-08-19

## 2025-08-19 RX ORDER — ALBUTEROL SULFATE 90 UG/1
2 INHALANT RESPIRATORY (INHALATION) EVERY 4 HOURS PRN
Qty: 18 G | Refills: 5 | Status: SHIPPED | OUTPATIENT
Start: 2025-08-19 | End: 2025-09-18

## 2025-08-19 RX ORDER — IPRATROPIUM BROMIDE AND ALBUTEROL SULFATE 2.5; .5 MG/3ML; MG/3ML
3 SOLUTION RESPIRATORY (INHALATION)
Qty: 350 ML | Refills: 5 | Status: SHIPPED | OUTPATIENT
Start: 2025-08-19 | End: 2026-02-15

## 2025-08-19 ASSESSMENT — COPD QUESTIONNAIRES
QUESTION2_CHESTPHLEGM: 4
QUESTION3_CHESTTIGHTNESS: 3
QUESTION1_COUGHFREQUENCY: 4
QUESTION7_SLEEPQUALITY: 3
QUESTION5_HOMEACTIVITIES: 2
QUESTION4_WALKINCLINE: 3
QUESTION6_LEAVINGHOUSE: 2
QUESTION8_ENERGYLEVEL: 3
CAT_TOTALSCORE: 24

## 2025-08-19 ASSESSMENT — ENCOUNTER SYMPTOMS
SHORTNESS OF BREATH: 1
FEVER: 0
COUGH: 1
WHEEZING: 0

## 2025-08-25 ENCOUNTER — APPOINTMENT (OUTPATIENT)
Dept: PULMONOLOGY | Facility: CLINIC | Age: 70
End: 2025-08-25
Payer: MEDICARE